# Patient Record
Sex: FEMALE | Race: WHITE | NOT HISPANIC OR LATINO | URBAN - METROPOLITAN AREA
[De-identification: names, ages, dates, MRNs, and addresses within clinical notes are randomized per-mention and may not be internally consistent; named-entity substitution may affect disease eponyms.]

---

## 2017-02-06 ENCOUNTER — INPATIENT (INPATIENT)
Facility: HOSPITAL | Age: 77
LOS: 3 days | Discharge: HOME | End: 2017-02-10
Attending: HOSPITALIST | Admitting: HOSPITALIST
Payer: COMMERCIAL

## 2017-02-06 VITALS
TEMPERATURE: 98 F | OXYGEN SATURATION: 91 % | SYSTOLIC BLOOD PRESSURE: 151 MMHG | HEIGHT: 65 IN | HEART RATE: 101 BPM | WEIGHT: 139.99 LBS | DIASTOLIC BLOOD PRESSURE: 66 MMHG | RESPIRATION RATE: 22 BRPM

## 2017-02-06 DIAGNOSIS — J18.1 LOBAR PNEUMONIA, UNSPECIFIED ORGANISM: ICD-10-CM

## 2017-02-06 DIAGNOSIS — E78.00 PURE HYPERCHOLESTEROLEMIA, UNSPECIFIED: ICD-10-CM

## 2017-02-06 DIAGNOSIS — J44.1 CHRONIC OBSTRUCTIVE PULMONARY DISEASE WITH (ACUTE) EXACERBATION: ICD-10-CM

## 2017-02-06 DIAGNOSIS — I10 ESSENTIAL (PRIMARY) HYPERTENSION: ICD-10-CM

## 2017-02-06 DIAGNOSIS — J43.9 EMPHYSEMA, UNSPECIFIED: ICD-10-CM

## 2017-02-06 LAB
ALBUMIN SERPL ELPH-MCNC: 3.4 G/DL — SIGNIFICANT CHANGE UP (ref 3.3–5)
ALP SERPL-CCNC: 67 U/L — SIGNIFICANT CHANGE UP (ref 40–120)
ALT FLD-CCNC: 27 U/L — SIGNIFICANT CHANGE UP (ref 12–78)
ANION GAP SERPL CALC-SCNC: 7 MMOL/L — SIGNIFICANT CHANGE UP (ref 5–17)
APPEARANCE UR: CLEAR — SIGNIFICANT CHANGE UP
APTT BLD: 29.5 SEC — SIGNIFICANT CHANGE UP (ref 27.5–37.4)
AST SERPL-CCNC: 20 U/L — SIGNIFICANT CHANGE UP (ref 15–37)
BACTERIA # UR AUTO: ABNORMAL
BASE EXCESS BLDA CALC-SCNC: 3.3 MMOL/L — HIGH (ref -2–2)
BILIRUB SERPL-MCNC: 0.5 MG/DL — SIGNIFICANT CHANGE UP (ref 0.2–1.2)
BILIRUB UR-MCNC: NEGATIVE — SIGNIFICANT CHANGE UP
BLOOD GAS COMMENTS: SIGNIFICANT CHANGE UP
BLOOD GAS SOURCE: SIGNIFICANT CHANGE UP
BUN SERPL-MCNC: 15 MG/DL — SIGNIFICANT CHANGE UP (ref 7–23)
CALCIUM SERPL-MCNC: 8.9 MG/DL — SIGNIFICANT CHANGE UP (ref 8.5–10.1)
CHLORIDE SERPL-SCNC: 102 MMOL/L — SIGNIFICANT CHANGE UP (ref 96–108)
CK MB BLD-MCNC: 1.1 % — SIGNIFICANT CHANGE UP (ref 0–3.5)
CK MB CFR SERPL CALC: 1.9 NG/ML — SIGNIFICANT CHANGE UP (ref 0.5–3.6)
CK SERPL-CCNC: 173 U/L — SIGNIFICANT CHANGE UP (ref 26–192)
CO2 SERPL-SCNC: 30 MMOL/L — SIGNIFICANT CHANGE UP (ref 22–31)
COLOR SPEC: YELLOW — SIGNIFICANT CHANGE UP
COMMENT - URINE: SIGNIFICANT CHANGE UP
CREAT SERPL-MCNC: 0.89 MG/DL — SIGNIFICANT CHANGE UP (ref 0.5–1.3)
DIFF PNL FLD: ABNORMAL
EPI CELLS # UR: ABNORMAL
FLUAV SPEC QL CULT: NEGATIVE — SIGNIFICANT CHANGE UP
FLUBV AG SPEC QL IA: NEGATIVE — SIGNIFICANT CHANGE UP
GLUCOSE SERPL-MCNC: 151 MG/DL — HIGH (ref 70–99)
GLUCOSE UR QL: NEGATIVE MG/DL — SIGNIFICANT CHANGE UP
HCO3 BLDA-SCNC: 26 MMOL/L — SIGNIFICANT CHANGE UP (ref 21–29)
HCT VFR BLD CALC: 38.1 % — SIGNIFICANT CHANGE UP (ref 34.5–45)
HGB BLD-MCNC: 13.2 G/DL — SIGNIFICANT CHANGE UP (ref 11.5–15.5)
HOROWITZ INDEX BLDA+IHG-RTO: 21 — SIGNIFICANT CHANGE UP
INR BLD: 1.08 RATIO — SIGNIFICANT CHANGE UP (ref 0.88–1.16)
KETONES UR-MCNC: NEGATIVE — SIGNIFICANT CHANGE UP
LACTATE SERPL-SCNC: 1.2 MMOL/L — SIGNIFICANT CHANGE UP (ref 0.7–2)
LEUKOCYTE ESTERASE UR-ACNC: ABNORMAL
LYMPHOCYTES # BLD AUTO: 9 % — LOW (ref 13–44)
MCHC RBC-ENTMCNC: 30.7 PG — SIGNIFICANT CHANGE UP (ref 27–34)
MCHC RBC-ENTMCNC: 34.8 GM/DL — SIGNIFICANT CHANGE UP (ref 32–36)
MCV RBC AUTO: 88.3 FL — SIGNIFICANT CHANGE UP (ref 80–100)
MONOCYTES NFR BLD AUTO: 2 % — SIGNIFICANT CHANGE UP (ref 2–14)
NEUTROPHILS NFR BLD AUTO: 89 % — HIGH (ref 43–77)
NITRITE UR-MCNC: NEGATIVE — SIGNIFICANT CHANGE UP
PCO2 BLDA: 37 MMHG — SIGNIFICANT CHANGE UP (ref 32–46)
PH BLD: 7.47 — HIGH (ref 7.35–7.45)
PH UR: 6 — SIGNIFICANT CHANGE UP (ref 4.8–8)
PLAT MORPH BLD: NORMAL — SIGNIFICANT CHANGE UP
PLATELET # BLD AUTO: 281 K/UL — SIGNIFICANT CHANGE UP (ref 150–400)
PO2 BLDA: 64 MMHG — LOW (ref 74–108)
POTASSIUM SERPL-MCNC: 4 MMOL/L — SIGNIFICANT CHANGE UP (ref 3.5–5.3)
POTASSIUM SERPL-SCNC: 4 MMOL/L — SIGNIFICANT CHANGE UP (ref 3.5–5.3)
PROT SERPL-MCNC: 7.5 GM/DL — SIGNIFICANT CHANGE UP (ref 6–8.3)
PROT UR-MCNC: NEGATIVE MG/DL — SIGNIFICANT CHANGE UP
PROTHROM AB SERPL-ACNC: 12.1 SEC — SIGNIFICANT CHANGE UP (ref 10–13.1)
RBC # BLD: 4.31 M/UL — SIGNIFICANT CHANGE UP (ref 3.8–5.2)
RBC # FLD: 12.1 % — SIGNIFICANT CHANGE UP (ref 11–15)
RBC BLD AUTO: NORMAL — SIGNIFICANT CHANGE UP
RBC CASTS # UR COMP ASSIST: SIGNIFICANT CHANGE UP /HPF (ref 0–4)
SAO2 % BLDA: 92 % — SIGNIFICANT CHANGE UP (ref 92–96)
SODIUM SERPL-SCNC: 139 MMOL/L — SIGNIFICANT CHANGE UP (ref 135–145)
SP GR SPEC: 1.01 — SIGNIFICANT CHANGE UP (ref 1.01–1.02)
TROPONIN I SERPL-MCNC: <.015 NG/ML — SIGNIFICANT CHANGE UP (ref 0.01–0.04)
UROBILINOGEN FLD QL: NEGATIVE MG/DL — SIGNIFICANT CHANGE UP
WBC # BLD: 20.5 K/UL — HIGH (ref 3.8–10.5)
WBC # FLD AUTO: 20.5 K/UL — HIGH (ref 3.8–10.5)
WBC UR QL: ABNORMAL

## 2017-02-06 PROCEDURE — 71250 CT THORAX DX C-: CPT | Mod: 26

## 2017-02-06 PROCEDURE — 71010: CPT | Mod: 26

## 2017-02-06 PROCEDURE — 99285 EMERGENCY DEPT VISIT HI MDM: CPT

## 2017-02-06 RX ORDER — FLUTICASONE PROPIONATE AND SALMETEROL 50; 250 UG/1; UG/1
1 POWDER ORAL; RESPIRATORY (INHALATION)
Qty: 0 | Refills: 0 | Status: DISCONTINUED | OUTPATIENT
Start: 2017-02-06 | End: 2017-02-10

## 2017-02-06 RX ORDER — PANTOPRAZOLE SODIUM 20 MG/1
40 TABLET, DELAYED RELEASE ORAL ONCE
Qty: 0 | Refills: 0 | Status: COMPLETED | OUTPATIENT
Start: 2017-02-06 | End: 2017-02-06

## 2017-02-06 RX ORDER — ALBUTEROL 90 UG/1
2.5 AEROSOL, METERED ORAL
Qty: 0 | Refills: 0 | Status: DISCONTINUED | OUTPATIENT
Start: 2017-02-06 | End: 2017-02-06

## 2017-02-06 RX ORDER — ACETYLCYSTEINE 200 MG/ML
2 VIAL (ML) MISCELLANEOUS EVERY 6 HOURS
Qty: 0 | Refills: 0 | Status: DISCONTINUED | OUTPATIENT
Start: 2017-02-06 | End: 2017-02-08

## 2017-02-06 RX ORDER — IPRATROPIUM/ALBUTEROL SULFATE 18-103MCG
3 AEROSOL WITH ADAPTER (GRAM) INHALATION EVERY 6 HOURS
Qty: 0 | Refills: 0 | Status: DISCONTINUED | OUTPATIENT
Start: 2017-02-06 | End: 2017-02-10

## 2017-02-06 RX ORDER — ENOXAPARIN SODIUM 100 MG/ML
40 INJECTION SUBCUTANEOUS EVERY 24 HOURS
Qty: 0 | Refills: 0 | Status: DISCONTINUED | OUTPATIENT
Start: 2017-02-06 | End: 2017-02-10

## 2017-02-06 RX ORDER — ATORVASTATIN CALCIUM 80 MG/1
10 TABLET, FILM COATED ORAL AT BEDTIME
Qty: 0 | Refills: 0 | Status: DISCONTINUED | OUTPATIENT
Start: 2017-02-06 | End: 2017-02-10

## 2017-02-06 RX ORDER — ASPIRIN/CALCIUM CARB/MAGNESIUM 324 MG
81 TABLET ORAL DAILY
Qty: 0 | Refills: 0 | Status: DISCONTINUED | OUTPATIENT
Start: 2017-02-06 | End: 2017-02-06

## 2017-02-06 RX ORDER — ASPIRIN/CALCIUM CARB/MAGNESIUM 324 MG
81 TABLET ORAL DAILY
Qty: 0 | Refills: 0 | Status: DISCONTINUED | OUTPATIENT
Start: 2017-02-06 | End: 2017-02-10

## 2017-02-06 RX ORDER — IPRATROPIUM/ALBUTEROL SULFATE 18-103MCG
3 AEROSOL WITH ADAPTER (GRAM) INHALATION ONCE
Qty: 0 | Refills: 0 | Status: COMPLETED | OUTPATIENT
Start: 2017-02-06 | End: 2017-02-06

## 2017-02-06 RX ORDER — ALBUTEROL 90 UG/1
2 AEROSOL, METERED ORAL
Qty: 0 | Refills: 0 | COMMUNITY

## 2017-02-06 RX ORDER — ALBUTEROL 90 UG/1
2 AEROSOL, METERED ORAL EVERY 6 HOURS
Qty: 0 | Refills: 0 | Status: DISCONTINUED | OUTPATIENT
Start: 2017-02-06 | End: 2017-02-06

## 2017-02-06 RX ORDER — MAGNESIUM SULFATE 500 MG/ML
2 VIAL (ML) INJECTION ONCE
Qty: 0 | Refills: 0 | Status: COMPLETED | OUTPATIENT
Start: 2017-02-06 | End: 2017-02-06

## 2017-02-06 RX ADMIN — Medication 3 MILLILITER(S): at 12:00

## 2017-02-06 RX ADMIN — Medication 81 MILLIGRAM(S): at 15:54

## 2017-02-06 RX ADMIN — PANTOPRAZOLE SODIUM 40 MILLIGRAM(S): 20 TABLET, DELAYED RELEASE ORAL at 12:54

## 2017-02-06 RX ADMIN — Medication 50 GRAM(S): at 13:01

## 2017-02-06 RX ADMIN — Medication 125 MILLIGRAM(S): at 12:57

## 2017-02-06 RX ADMIN — FLUTICASONE PROPIONATE AND SALMETEROL 1 DOSE(S): 50; 250 POWDER ORAL; RESPIRATORY (INHALATION) at 21:32

## 2017-02-06 RX ADMIN — ATORVASTATIN CALCIUM 10 MILLIGRAM(S): 80 TABLET, FILM COATED ORAL at 22:54

## 2017-02-06 RX ADMIN — ENOXAPARIN SODIUM 40 MILLIGRAM(S): 100 INJECTION SUBCUTANEOUS at 15:55

## 2017-02-06 RX ADMIN — Medication 3 MILLILITER(S): at 12:30

## 2017-02-06 RX ADMIN — Medication 3 MILLILITER(S): at 11:40

## 2017-02-06 NOTE — H&P ADULT. - RS GEN PE MLT RESP DETAILS PC
diminished breath sounds, R/wheezes/respirations labored/rhonchi/diminished breath sounds, L/airway patent

## 2017-02-06 NOTE — H&P ADULT. - HISTORY OF PRESENT ILLNESS
77 y/o female PMH significant for COPD/Emphysema ex tobacco use presents with a three day history of increased shortness of breath and dyspnea on exertion different from her baseline. She had  taken 10 mg of prednisone left over from a previous COPD exacerbation and reports that her albuterol inhaler has not been as effective. Patient denies sick contacts or recent travel has two pets at home (dog and bird). Patient denies fever but has increased cough productive of green sputum   Patient denies new chest pain

## 2017-02-06 NOTE — ED ADULT NURSE NOTE - OBJECTIVE STATEMENT
Pt with cough for 1 week she has a history of COPD and the cough is productive and frequent. Pt with cough for 1 week she has a history of COPD and the cough is productive and frequent. She has a history of smoking and stopped 15 to 16 years

## 2017-02-06 NOTE — H&P ADULT. - ASSESSMENT
7/7 y/[age] female with COPD presents with community aquired right upperlobe pneumonia inpatient admission required, Will need DVT prophalaxis

## 2017-02-06 NOTE — ED PROVIDER NOTE - OBJECTIVE STATEMENT
76 years old female walked in c/o productive green sputum coughs sob wheezing for 5 days and pt has been using neub every 6 hours last was this morning but worsening in the past two days. Pt sts she has a hx of copd never being et intubated and no on home O2 oxygen, last admission to hospital for copd was many years ago. Pt denies headache, dizziness, blurred visions, chest pain, nausea, vomiting, fever, chills, abd pain, dysuria or irregular bowel movements.

## 2017-02-06 NOTE — CONSULT NOTE ADULT - SUBJECTIVE AND OBJECTIVE BOX
Patient is a 76y old  Female who presents with a chief complaint of Increasing shortnesss of breath (2017 14:55)      HPI:  75 y/o female PMH significant for HTN, HLD, COPD/Emphysema(not on home O2), ex tobacco use presents with a three day history of increased shortness of breath and dyspnea on exertion different from her baseline. Reports it started after a cold like illness and since then has gotten worst. She had  taken 10 mg of prednisone left over from a previous COPD exacerbation and reports that her albuterol inhaler has not been as effective. Patient denies sick contacts or recent travel. Denies fever but has increased cough productive of green sputum which some times is difficult to cough up. Up to date with pneumo vaccine but did not get Flu vaccine.  Admitted with RUL pneumonia and COPD exacerbation. Smoked close to 40 years, quit 15 years ago.    PAST MEDICAL & SURGICAL HISTORY:  COPD (chronic obstructive pulmonary disease)  Hypercholesteremia  Hypertension  No significant past surgical history      FAMILY HISTORY:  No pertinent family history in first degree relatives    SOCIAL HISTORY: BMI (kg/m2): 23.3 . 40 year smoking. quit 15 years ago.    Allergies  penicillin (Hives; Rash)    MEDICATIONS  (STANDING):  aspirin  chewable 81milliGRAM(s) Oral daily  atorvastatin 10milliGRAM(s) Oral at bedtime  ALBUTerol    90 MICROgram(s) HFA Inhaler 2Puff(s) Inhalation every 6 hours  enoxaparin Injectable 40milliGRAM(s) SubCutaneous every 24 hours  predniSONE   Tablet 50milliGRAM(s) Oral daily  fluticasone / salmeterol 250-50 MICROgram(s) Diskus 1Dose(s) Inhalation two times a day    MEDICATIONS  (PRN):  ALBUTerol    0.083% 2.5milliGRAM(s) Nebulizer four times a day PRN Bronchospasm    REVIEW OF SYSTEMS:    Constitutional:            No fever, weight loss or fatigue  HEENT:                         No difficulty hearing, tinnitus, vertigo; No sinus or throat pain  Respiratory:              sob and cough  Cardiovascular:           No chest pain, palpitations  Gastrointestinal:        No abdominal or epigastric pain. No N/V/diarrhea or hematemesis  Genitourinary:            No dysuria, frequency, hematuria or incontinence  SKIN:                             no rash  Musculoskeletal:        No joint pain or swelling  Extremities:                No swelling  Neurological:              No headaches  Psychiatric:                 No depression, anxiety    Vital Signs Last 24 Hrs  T(C): 36.7, Max: 36.9 ( @ 10:32)  T(F): 98, Max: 98.4 ( @ 10:32)  HR: 85 (85 - 101)  BP: 100/56 (100/56 - 151/66)  BP(mean): --  RR: 19 (19 - 22)  SpO2: 97% (91% - 97%)    PHYSICAL EXAM:  GEN:         Awake, responsive and comfortable.  HEENT:    Normal.    RESP:       wheezing.  CVS:           Regular rate and rhythm.   ABD:         Soft, non-tender, non-distended;   :             No costovertebral angle tenderness  SKIN:           Warm and dry.  EXTR:            No clubbing, cyanosis or edema  CNS:              Intact sensory and motor function.  PSYCH:        cooperative, no anxiety or depression    LABS:                        13.2   20.5  )-----------( 281      ( 2017 12:49 )             38.1     2017 12:49    139    |  102    |  15     ----------------------------<  151    4.0     |  30     |  0.89     Ca    8.9        2017 12:49    TPro  7.5    /  Alb  3.4    /  TBili  0.5    /  DBili  x      /  AST  20     /  ALT  27     /  AlkPhos  67     2017 12:49    PT/INR - ( 2017 12:49 )   PT: 12.1 sec;   INR: 1.08 ratio         PTT - ( 2017 12:49 )  PTT:29.5 sec  ABG -  @ 12:07  pH: 7.47 / pO2: 37 / HCO3: 26 / Base Excess: 3.3 / SaO2: 92    Urinalysis Basic - ( 2017 19:02 )    Color: Yellow / Appearance: Clear / S.010 / pH: x  Gluc: x / Ketone: Negative  / Bili: Negative / Urobili: Negative mg/dL   Blood: x / Protein: Negative mg/dL / Nitrite: Negative   Leuk Esterase: Moderate / RBC: 0-2 /HPF / WBC 11-25   Sq Epi: x / Non Sq Epi: Moderate / Bacteria: Many    EKG: sinus rhythm.    RADIOLOGY & ADDITIONAL STUDIES:    EXAM:  CT CHEST                            PROCEDURE DATE:  2017        INTERPRETATION:  Clinical information: Right upper lobe infiltrate,   question cavitation    Comparison: None    PROCEDURE:   CT of the Chest was performed without intravenous contrast.    FINDINGS:    CHEST:    CHEST WALL AND LOWER NECK: Within normal limits  MEDIASTINUM AND NIYAH: 1 cm mediastinal lymph nodes, nonspecific and may   be reactive.  HEART: Extensive coronary calcifications.  VESSELS: Unopacified great vessels are unremarkable.  LUNG AND LARGE AIRWAYS: Extensive emphysema. Patchy airspace opacities in   the right upper lobe. 1 cm nodule anterior segment right upper lobe image   49, probably infectious but should be followed. Scattered tree-in-bud   opacities in the right lower lobe. No pleural effusion. 7 mm nodule left   lower lobe with probable fat, possibly hamartoma but should be followed.   4 mm nodule left upper lobe, indeterminate.    UPPER ABDOMEN: Within normal limits    MUSCULOSKELETAL: Within normal limits    IMPRESSION:     Right upper lobe pneumonia. Extensive emphysema. Incidental subcentimeter   lung nodules, as described, 3-6 months follow-up with a low dose CT is   recommended.    KASIA SEYMOUR M.D., ATTENDING RADIOLOGIST  This document has been electronically signed. 2017  1:52PM      ASSESSMENT AND PLAN:  ·	RUL pneumonia  (CAP).  ·	Acute COPD exacerbation.  ·	SOB.  ·	Cough.  ·	Leukocytosis.  ·	UTI.  ·	HTN.  ·	HLD    Supplemental O2.  Nebulizer with Mucomyst.  Antibiotics.  Steroids.  DVT prophylaxis.

## 2017-02-06 NOTE — ED PROVIDER NOTE - CONSTITUTIONAL, MLM
normal... Well appearing, well nourished, awake, alert, oriented to person, place, time/situation and in no apparent distress. Speaking in clear full sentences active coughing, + nasal flaring no shoulders retractions no diaphoresis

## 2017-02-07 DIAGNOSIS — D72.829 ELEVATED WHITE BLOOD CELL COUNT, UNSPECIFIED: ICD-10-CM

## 2017-02-07 LAB
ALBUMIN SERPL ELPH-MCNC: 3.2 G/DL — LOW (ref 3.3–5)
ALP SERPL-CCNC: 70 U/L — SIGNIFICANT CHANGE UP (ref 40–120)
ALT FLD-CCNC: 23 U/L — SIGNIFICANT CHANGE UP (ref 12–78)
ANION GAP SERPL CALC-SCNC: 8 MMOL/L — SIGNIFICANT CHANGE UP (ref 5–17)
AST SERPL-CCNC: 20 U/L — SIGNIFICANT CHANGE UP (ref 15–37)
BILIRUB SERPL-MCNC: 0.3 MG/DL — SIGNIFICANT CHANGE UP (ref 0.2–1.2)
BUN SERPL-MCNC: 19 MG/DL — SIGNIFICANT CHANGE UP (ref 7–23)
CALCIUM SERPL-MCNC: 9.1 MG/DL — SIGNIFICANT CHANGE UP (ref 8.5–10.1)
CHLORIDE SERPL-SCNC: 103 MMOL/L — SIGNIFICANT CHANGE UP (ref 96–108)
CO2 SERPL-SCNC: 29 MMOL/L — SIGNIFICANT CHANGE UP (ref 22–31)
CREAT SERPL-MCNC: 0.98 MG/DL — SIGNIFICANT CHANGE UP (ref 0.5–1.3)
CRP SERPL-MCNC: 16 MG/DL — HIGH (ref 0–0.4)
GLUCOSE SERPL-MCNC: 150 MG/DL — HIGH (ref 70–99)
GRAM STN FLD: SIGNIFICANT CHANGE UP
HCT VFR BLD CALC: 37.5 % — SIGNIFICANT CHANGE UP (ref 34.5–45)
HGB BLD-MCNC: 13.2 G/DL — SIGNIFICANT CHANGE UP (ref 11.5–15.5)
LACTATE SERPL-SCNC: 2.3 MMOL/L — HIGH (ref 0.7–2)
LEGIONELLA AG UR QL: NEGATIVE — SIGNIFICANT CHANGE UP
MCHC RBC-ENTMCNC: 31.7 PG — SIGNIFICANT CHANGE UP (ref 27–34)
MCHC RBC-ENTMCNC: 35.3 GM/DL — SIGNIFICANT CHANGE UP (ref 32–36)
MCV RBC AUTO: 89.7 FL — SIGNIFICANT CHANGE UP (ref 80–100)
PLATELET # BLD AUTO: 338 K/UL — SIGNIFICANT CHANGE UP (ref 150–400)
POTASSIUM SERPL-MCNC: 4.8 MMOL/L — SIGNIFICANT CHANGE UP (ref 3.5–5.3)
POTASSIUM SERPL-SCNC: 4.8 MMOL/L — SIGNIFICANT CHANGE UP (ref 3.5–5.3)
PROCALCITONIN SERPL-MCNC: 0.12 NG/ML — HIGH (ref 0–0.05)
PROT SERPL-MCNC: 7.5 GM/DL — SIGNIFICANT CHANGE UP (ref 6–8.3)
RBC # BLD: 4.18 M/UL — SIGNIFICANT CHANGE UP (ref 3.8–5.2)
RBC # FLD: 12.3 % — SIGNIFICANT CHANGE UP (ref 11–15)
SODIUM SERPL-SCNC: 140 MMOL/L — SIGNIFICANT CHANGE UP (ref 135–145)
SPECIMEN SOURCE: SIGNIFICANT CHANGE UP
WBC # BLD: 22.7 K/UL — HIGH (ref 3.8–10.5)
WBC # FLD AUTO: 22.7 K/UL — HIGH (ref 3.8–10.5)

## 2017-02-07 PROCEDURE — 99223 1ST HOSP IP/OBS HIGH 75: CPT

## 2017-02-07 RX ORDER — SODIUM CHLORIDE 9 MG/ML
1000 INJECTION INTRAMUSCULAR; INTRAVENOUS; SUBCUTANEOUS ONCE
Qty: 0 | Refills: 0 | Status: COMPLETED | OUTPATIENT
Start: 2017-02-07 | End: 2017-02-07

## 2017-02-07 RX ORDER — SODIUM CHLORIDE 9 MG/ML
1000 INJECTION INTRAMUSCULAR; INTRAVENOUS; SUBCUTANEOUS
Qty: 0 | Refills: 0 | Status: DISCONTINUED | OUTPATIENT
Start: 2017-02-07 | End: 2017-02-10

## 2017-02-07 RX ADMIN — ATORVASTATIN CALCIUM 10 MILLIGRAM(S): 80 TABLET, FILM COATED ORAL at 21:10

## 2017-02-07 RX ADMIN — Medication 2 MILLILITER(S): at 06:09

## 2017-02-07 RX ADMIN — FLUTICASONE PROPIONATE AND SALMETEROL 1 DOSE(S): 50; 250 POWDER ORAL; RESPIRATORY (INHALATION) at 06:09

## 2017-02-07 RX ADMIN — Medication 40 MILLIGRAM(S): at 21:10

## 2017-02-07 RX ADMIN — Medication 3 MILLILITER(S): at 00:27

## 2017-02-07 RX ADMIN — Medication 2 MILLILITER(S): at 00:27

## 2017-02-07 RX ADMIN — SODIUM CHLORIDE 100 MILLILITER(S): 9 INJECTION INTRAMUSCULAR; INTRAVENOUS; SUBCUTANEOUS at 12:41

## 2017-02-07 RX ADMIN — SODIUM CHLORIDE 100 MILLILITER(S): 9 INJECTION INTRAMUSCULAR; INTRAVENOUS; SUBCUTANEOUS at 16:47

## 2017-02-07 RX ADMIN — Medication 81 MILLIGRAM(S): at 12:41

## 2017-02-07 RX ADMIN — Medication 50 MILLIGRAM(S): at 06:09

## 2017-02-07 RX ADMIN — Medication 3 MILLILITER(S): at 06:09

## 2017-02-07 RX ADMIN — Medication 2 MILLILITER(S): at 18:10

## 2017-02-07 RX ADMIN — Medication 3 MILLILITER(S): at 18:09

## 2017-02-07 RX ADMIN — Medication 3 MILLILITER(S): at 12:10

## 2017-02-07 RX ADMIN — ENOXAPARIN SODIUM 40 MILLIGRAM(S): 100 INJECTION SUBCUTANEOUS at 16:47

## 2017-02-07 RX ADMIN — SODIUM CHLORIDE 1000 MILLILITER(S): 9 INJECTION INTRAMUSCULAR; INTRAVENOUS; SUBCUTANEOUS at 10:46

## 2017-02-07 RX ADMIN — Medication 2 MILLILITER(S): at 12:10

## 2017-02-07 NOTE — PROGRESS NOTE ADULT - SUBJECTIVE AND OBJECTIVE BOX
INTERVAL HPI/OVERNIGHT EVENTS:  77 y/o female PMH significant for HTN, HLD, COPD/Emphysema(not on home O2), ex tobacco use presents with a three day history of increased shortness of breath and dyspnea on exertion different from her baseline. Reports it started after a cold like illness and since then has gotten worst. She had  taken 10 mg of prednisone left over from a previous COPD exacerbation and reports that her albuterol inhaler has not been as effective. Patient denies sick contacts or recent travel. Denies fever but has increased cough productive of green sputum which some times is difficult to cough up. Up to date with pneumo vaccine but did not get Flu vaccine.  Admitted with RUL pneumonia and COPD exacerbation. Smoked close to 40 years, quit 15 years ago.  Still with  episodes of cough.    Vital Signs Last 24 Hrs  T(C): 36.7, Max: 36.8 (02-07 @ 03:46)  T(F): 98, Max: 98.3 (02-07 @ 12:20)  HR: 99 (78 - 100)  BP: 134/70 (105/57 - 150/66)  BP(mean): --  RR: 18 (14 - 18)  SpO2: 95% (93% - 95%)    PHYSICAL EXAM:  GEN:        Awake, responsive and comfortable.  HEENT:    Normal.    RESP:      decreased air entry.  CVS:          Regular rate and rhythm.   ABD:         Soft, non-tender, non-distended;   :             No costovertebral angle tenderness  EXTR:            No clubbing, cyanosis or edema  CNS:              Intact sensory and motor function.    MEDICATIONS  (STANDING):  aspirin  chewable 81milliGRAM(s) Oral daily  atorvastatin 10milliGRAM(s) Oral at bedtime  enoxaparin Injectable 40milliGRAM(s) SubCutaneous every 24 hours  predniSONE   Tablet 50milliGRAM(s) Oral daily  fluticasone / salmeterol 250-50 MICROgram(s) Diskus 1Dose(s) Inhalation two times a day  ALBUTerol/ipratropium for Nebulization 3milliLiter(s) Nebulizer every 6 hours  acetylcysteine 10% Inhalation 2milliLiter(s) Inhalation every 6 hours  levoFLOXacin IVPB 750milliGRAM(s) IV Intermittent every 24 hours  sodium chloride 0.9%. 1000milliLiter(s) IV Continuous <Continuous>    LABS:                        13.2   22.7  )-----------( 338      ( 2017 06:02 )             37.5     2017 06:02    140    |  103    |  19     ----------------------------<  150    4.8     |  29     |  0.98     Ca    9.1        2017 06:02    TPro  7.5    /  Alb  3.2    /  TBili  0.3    /  DBili  x      /  AST  20     /  ALT  23     /  AlkPhos  70     2017 06:02    PT/INR - ( 2017 12:49 )   PT: 12.1 sec;   INR: 1.08 ratio         PTT - ( 2017 12:49 )  PTT:29.5 sec    Urinalysis Basic - ( 2017 19:02 )    Color: Yellow / Appearance: Clear / S.010 / pH: x  Gluc: x / Ketone: Negative  / Bili: Negative / Urobili: Negative mg/dL   Blood: x / Protein: Negative mg/dL / Nitrite: Negative   Leuk Esterase: Moderate / RBC: 0-2 /HPF / WBC 11-25   Sq Epi: x / Non Sq Epi: Moderate / Bacteria: Many    ASSESSMENT AND PLAN:  ·	RUL pneumonia  (CAP).  ·	Acute COPD exacerbation.  ·	SOB.  ·	Cough.  ·	Leukocytosis.  ·	UTI.  ·	HTN.  ·	HLD    Continue nebulizer and Mucomyst.  Continue antibiotics.  On steroids, will change to IV.  Follow cultures.

## 2017-02-07 NOTE — PROGRESS NOTE ADULT - SUBJECTIVE AND OBJECTIVE BOX
Patient is a 76y old  Female who presents with a chief complaint of Increasing shortnesss of breath (2017 14:55)      INTERVAL HPI/OVERNIGHT EVENTS: NO acute events overrnight continues to be short of breath but is feeling ok today     MEDICATIONS  (STANDING):  aspirin  chewable 81milliGRAM(s) Oral daily  atorvastatin 10milliGRAM(s) Oral at bedtime  enoxaparin Injectable 40milliGRAM(s) SubCutaneous every 24 hours  predniSONE   Tablet 50milliGRAM(s) Oral daily  fluticasone / salmeterol 250-50 MICROgram(s) Diskus 1Dose(s) Inhalation two times a day  ALBUTerol/ipratropium for Nebulization 3milliLiter(s) Nebulizer every 6 hours  acetylcysteine 10% Inhalation 2milliLiter(s) Inhalation every 6 hours  levoFLOXacin IVPB 750milliGRAM(s) IV Intermittent every 24 hours  sodium chloride 0.9%. 1000milliLiter(s) IV Continuous <Continuous>    MEDICATIONS  (PRN):      Allergies    penicillin (Hives; Rash)    Intolerances        REVIEW OF SYSTEMS:  CONSTITUTIONAL: No fever, weight loss, or fatigue  EYES: No eye pain, visual disturbances, or discharge  ENMT:  No difficulty hearing, tinnitus, vertigo; No sinus or throat pain  NECK: No pain or stiffness  BREASTS: No pain, masses, or nipple discharge  RESPIRATORY: Positive cough and wheeze with shortness of dbreadth   CARDIOVASCULAR: No chest pain, palpitations, dizziness, or leg swelling  GASTROINTESTINAL: No abdominal or epigastric pain. No nausea, vomiting, or hematemesis; No diarrhea or constipation. No melena or hematochezia.  GENITOURINARY: No dysuria, frequency, hematuria, or incontinence  NEUROLOGICAL: No headaches, memory loss, loss of strength, numbness, or tremors  SKIN: No itching, burning, rashes, or lesions   LYMPH NODES: No enlarged glands  ENDOCRINE: No heat or cold intolerance; No hair loss  MUSCULOSKELETAL: No joint pain or swelling; No muscle, back, or extremity pain  PSYCHIATRIC: No depression, anxiety, mood swings, or difficulty sleeping  HEME/LYMPH: No easy bruising, or bleeding gums  ALLERGY AND IMMUNOLOGIC: No hives or eczema    Vital Signs Last 24 Hrs  T(C): 36.8, Max: 36.8 ( @ 03:46)  T(F): 98.2, Max: 98.2 (- @ 03:46)  HR: 87 (78 - 87)  BP: 121/71 (100/56 - 131/67)  BP(mean): --  RR: 15 (15 - 19)  SpO2: 95% (93% - 97%)    PHYSICAL EXAM:  GENERAL: NAD, well-groomed, well-developed  HEAD:  Atraumatic, Normocephalic  EYES: EOMI, PERRLA, conjunctiva and sclera clear  ENMT: No tonsillar erythema, exudates, or enlargement; Moist mucous membranes, Good dentition, No lesions  NECK: Supple, No JVD, Normal thyroid  NERVOUS SYSTEM:  Alert & Oriented X3, Good concentration; Motor Strength 5/5 B/L upper and lower extremities; DTRs 2+ intact and symmetric  CHEST/LUNG:decreased breadth sounds right lung   HEART: Regular rate and rhythm; No murmurs, rubs, or gallops  ABDOMEN: Soft, Nontender, Nondistended; Bowel sounds present  EXTREMITIES:  2+ Peripheral Pulses, No clubbing, cyanosis, or edema  LYMPH: No lymphadenopathy noted  SKIN: No rashes or lesions    LABS:                        13.2   22.7  )-----------( 338      ( 2017 06:02 )             37.5     2017 06:02    140    |  103    |  19     ----------------------------<  150    4.8     |  29     |  0.98     Ca    9.1        2017 06:02    TPro  7.5    /  Alb  3.2    /  TBili  0.3    /  DBili  x      /  AST  20     /  ALT  23     /  AlkPhos  70     2017 06:02    PT/INR - ( 2017 12:49 )   PT: 12.1 sec;   INR: 1.08 ratio         PTT - ( 2017 12:49 )  PTT:29.5 sec  Urinalysis Basic - ( 2017 19:02 )    Color: Yellow / Appearance: Clear / S.010 / pH: x  Gluc: x / Ketone: Negative  / Bili: Negative / Urobili: Negative mg/dL   Blood: x / Protein: Negative mg/dL / Nitrite: Negative   Leuk Esterase: Moderate / RBC: 0-2 /HPF / WBC 11-25   Sq Epi: x / Non Sq Epi: Moderate / Bacteria: Many      CAPILLARY BLOOD GLUCOSE      RADIOLOGY & ADDITIONAL TESTS:    Imaging Personally Reviewed:  [X ] YES  [ ] NO    Consultant(s) Notes Reviewed:  XX ] YES  [ ] NO    Care Discussed with Consultants/Other Providers [X ] YES  [ ] NO

## 2017-02-07 NOTE — CONSULT NOTE ADULT - SUBJECTIVE AND OBJECTIVE BOX
Infectious Diseases - Attending at Dr. Arias    HPI:  77 y/o female PMH significant for COPD/Emphysema ex tobacco use presents with a three day history of increased shortness of breath and dyspnea on exertion different from her baseline. She had  taken 10 mg of prednisone left over from a previous COPD exacerbation and reports that her albuterol inhaler has not been as effective. Patient denies sick contacts or recent travel has two pets at home (dog and bird). Patient denies fever but has increased cough productive of green sputum   Patient denies new chest pain (2017 14:55)  Not able to bring phlegm anymore ,started on Iv antibiotics for a possible Pneumonia.      PAST MEDICAL & SURGICAL HISTORY:  COPD (chronic obstructive pulmonary disease)  Hypercholesteremia  Hypertension  No significant past surgical history      Allergies    penicillin (Hives; Rash)    Intolerances        FAMILY HISTORY:  No pertinent family history in first degree relatives      SOCIAL HISTORY:quit smoking 15 yrs ago    REVIEW OF SYSTEMS:    Constitutional: No fever, weight loss + fatigue  Eyes: No eye pain, visual disturbances, or discharge  ENT:  No difficulty hearing, tinnitus, vertigo; No sinus or throat pain  Neck: No pain or stiffness  Breasts: No pain, masses or nipple discharge  Respiratory: + cough,+ wheezing,NO hemoptysis  Cardiovascular: No chest pain, palpitations,+ shortness of breath, dizziness or leg swelling  Gastrointestinal: No abdominal or epigastric pain. No nausea, vomiting or hematemesis; No diarrhea or constipation. No melena or hematochezia.  Genitourinary: No dysuria, frequency, hematuria or incontinence  Rectal: No pain, hemorrhoids or incontinence  Neurological: No headaches, memory loss, loss of strength, numbness or tremors  Skin: No itching, burning, rashes or lesions   Lymph Nodes: No enlarged glands  Endocrine: No heat or cold intolerance; No hair loss  Musculoskeletal: No joint pain or swelling; No muscle, back or extremity pain  Psychiatric: No depression, anxiety, mood swings or difficulty sleeping  Heme/Lymph: No easy bruising or bleeding gums  Allergy and Immunologic: No hives or eczema    MEDICATIONS  (STANDING):  aspirin  chewable 81milliGRAM(s) Oral daily  atorvastatin 10milliGRAM(s) Oral at bedtime  enoxaparin Injectable 40milliGRAM(s) SubCutaneous every 24 hours  fluticasone / salmeterol 250-50 MICROgram(s) Diskus 1Dose(s) Inhalation two times a day  ALBUTerol/ipratropium for Nebulization 3milliLiter(s) Nebulizer every 6 hours  acetylcysteine 10% Inhalation 2milliLiter(s) Inhalation every 6 hours  levoFLOXacin IVPB 750milliGRAM(s) IV Intermittent every 24 hours  sodium chloride 0.9%. 1000milliLiter(s) IV Continuous <Continuous>  methylPREDNISolone sodium succinate Injectable 40milliGRAM(s) IV Push every 8 hours    MEDICATIONS  (PRN):      Vital Signs Last 24 Hrs  T(C): 36.7, Max: 36.8 ( @ 03:46)  T(F): 98, Max: 98.3 ( @ 12:20)  HR: 79 (78 - 100)  BP: 134/70 (105/57 - 150/66)  BP(mean): --  RR: 18 (14 - 18)  SpO2: 96% (93% - 96%)    PHYSICAL EXAM:    Constitutional: NAD, well-groomed, well-developed  HEENT: PERRLA, EOMI, Normal Hearing, MMM  Neck: No LAD, No JVD  Back: Normal spine flexure, No CVA tenderness  Respiratory: CTAB/L,wheezes present   Cardiovascular: S1 and S2, RRR, no M/G/R  Gastrointestinal: BS+, soft, NT/ND  Extremities: No peripheral edema  Vascular: 2+ peripheral pulses  Neurological: A/O x 3, no focal deficits  Skin: No rashes      LABS:                        13.2   22.7  )-----------( 338      ( 2017 06:02 )             37.5     2017 06:02    140    |  103    |  19     ----------------------------<  150    4.8     |  29     |  0.98     Ca    9.1        2017 06:02    TPro  7.5    /  Alb  3.2    /  TBili  0.3    /  DBili  x      /  AST  20     /  ALT  23     /  AlkPhos  70     2017 06:02    PT/INR - ( 2017 12:49 )   PT: 12.1 sec;   INR: 1.08 ratio         PTT - ( 2017 12:49 )  PTT:29.5 sec  Urinalysis Basic - ( 2017 19:02 )    Color: Yellow / Appearance: Clear / S.010 / pH: x  Gluc: x / Ketone: Negative  / Bili: Negative / Urobili: Negative mg/dL   Blood: x / Protein: Negative mg/dL / Nitrite: Negative   Leuk Esterase: Moderate / RBC: 0-2 /HPF / WBC 11-25   Sq Epi: x / Non Sq Epi: Moderate / Bacteria: Many        MICROBIOLOGY:  RECENT CULTURES:   .Sputum Sputum conical XXXX   Few WBC's  Few squamous epithelial cells  Rare Gram Positive Cocci in Clusters  Rare Yeast like cells XXXX    - .Blood Blood-Peripheral XXXX XXXX   No growth to date.          RADIOLOGY & ADDITIONAL STUDIES:EXAM:  CHEST SINGLE VIEW                            PROCEDURE DATE:  2017        INTERPRETATION:  CLINICAL INFORMATION:copd    TECHNIQUE: AP chest film. Comparison is made to 11/10/2015    FINDINGS: There are patchy opacities in the right upper lobe.  Heart size   is within normal limits. The osseous structures are intact.    IMPRESSION: New right upper lobe patchy opacities compatible with   pneumonia.

## 2017-02-07 NOTE — ED ADULT NURSE REASSESSMENT NOTE - NS ED NURSE REASSESS COMMENT FT1
pt received alert and oriented x3, pt  denies sob, chest pain, pt afebrile, pt not compliant with o2 states it is making her nose dry, pt educated on need for oxygen due to low sat on room air ,91 .
Pt continues with cough no SOB noted. She is alert and oriented. IV access patent. She is admitted pending bed assignment

## 2017-02-08 DIAGNOSIS — R50.9 FEVER, UNSPECIFIED: ICD-10-CM

## 2017-02-08 LAB
ALBUMIN SERPL ELPH-MCNC: 2.6 G/DL — LOW (ref 3.3–5)
ALP SERPL-CCNC: 62 U/L — SIGNIFICANT CHANGE UP (ref 40–120)
ALT FLD-CCNC: 24 U/L — SIGNIFICANT CHANGE UP (ref 12–78)
ANION GAP SERPL CALC-SCNC: 6 MMOL/L — SIGNIFICANT CHANGE UP (ref 5–17)
AST SERPL-CCNC: 26 U/L — SIGNIFICANT CHANGE UP (ref 15–37)
BILIRUB SERPL-MCNC: 0.4 MG/DL — SIGNIFICANT CHANGE UP (ref 0.2–1.2)
BUN SERPL-MCNC: 22 MG/DL — SIGNIFICANT CHANGE UP (ref 7–23)
CALCIUM SERPL-MCNC: 8.5 MG/DL — SIGNIFICANT CHANGE UP (ref 8.5–10.1)
CHLORIDE SERPL-SCNC: 109 MMOL/L — HIGH (ref 96–108)
CO2 SERPL-SCNC: 27 MMOL/L — SIGNIFICANT CHANGE UP (ref 22–31)
CREAT SERPL-MCNC: 0.88 MG/DL — SIGNIFICANT CHANGE UP (ref 0.5–1.3)
CULTURE RESULTS: SIGNIFICANT CHANGE UP
GLUCOSE SERPL-MCNC: 172 MG/DL — HIGH (ref 70–99)
HCT VFR BLD CALC: 33.6 % — LOW (ref 34.5–45)
HGB BLD-MCNC: 11.9 G/DL — SIGNIFICANT CHANGE UP (ref 11.5–15.5)
M TB TUBERC IFN-G BLD QL: 0 IU/ML — SIGNIFICANT CHANGE UP
M TB TUBERC IFN-G BLD QL: 0.02 IU/ML — SIGNIFICANT CHANGE UP
M TB TUBERC IFN-G BLD QL: ABNORMAL
MCHC RBC-ENTMCNC: 32.4 PG — SIGNIFICANT CHANGE UP (ref 27–34)
MCHC RBC-ENTMCNC: 35.3 GM/DL — SIGNIFICANT CHANGE UP (ref 32–36)
MCV RBC AUTO: 91.8 FL — SIGNIFICANT CHANGE UP (ref 80–100)
MITOGEN IGNF BCKGRD COR BLD-ACNC: 0.08 IU/ML — SIGNIFICANT CHANGE UP
PLATELET # BLD AUTO: 275 K/UL — SIGNIFICANT CHANGE UP (ref 150–400)
POTASSIUM SERPL-MCNC: 4.6 MMOL/L — SIGNIFICANT CHANGE UP (ref 3.5–5.3)
POTASSIUM SERPL-SCNC: 4.6 MMOL/L — SIGNIFICANT CHANGE UP (ref 3.5–5.3)
PROCALCITONIN SERPL-MCNC: 0.2 NG/ML — HIGH (ref 0–0.05)
PROT SERPL-MCNC: 6.4 GM/DL — SIGNIFICANT CHANGE UP (ref 6–8.3)
RBC # BLD: 3.67 M/UL — LOW (ref 3.8–5.2)
RBC # FLD: 13 % — SIGNIFICANT CHANGE UP (ref 11–15)
SODIUM SERPL-SCNC: 142 MMOL/L — SIGNIFICANT CHANGE UP (ref 135–145)
SPECIMEN SOURCE: SIGNIFICANT CHANGE UP
WBC # BLD: 25.9 K/UL — HIGH (ref 3.8–10.5)
WBC # FLD AUTO: 25.9 K/UL — HIGH (ref 3.8–10.5)

## 2017-02-08 PROCEDURE — 99233 SBSQ HOSP IP/OBS HIGH 50: CPT

## 2017-02-08 RX ORDER — ACETAMINOPHEN 500 MG
650 TABLET ORAL EVERY 6 HOURS
Qty: 0 | Refills: 0 | Status: DISCONTINUED | OUTPATIENT
Start: 2017-02-08 | End: 2017-02-10

## 2017-02-08 RX ADMIN — Medication 650 MILLIGRAM(S): at 00:41

## 2017-02-08 RX ADMIN — Medication 3 MILLILITER(S): at 17:01

## 2017-02-08 RX ADMIN — Medication 3 MILLILITER(S): at 00:18

## 2017-02-08 RX ADMIN — SODIUM CHLORIDE 100 MILLILITER(S): 9 INJECTION INTRAMUSCULAR; INTRAVENOUS; SUBCUTANEOUS at 19:08

## 2017-02-08 RX ADMIN — FLUTICASONE PROPIONATE AND SALMETEROL 1 DOSE(S): 50; 250 POWDER ORAL; RESPIRATORY (INHALATION) at 07:19

## 2017-02-08 RX ADMIN — ATORVASTATIN CALCIUM 10 MILLIGRAM(S): 80 TABLET, FILM COATED ORAL at 22:03

## 2017-02-08 RX ADMIN — Medication 81 MILLIGRAM(S): at 13:40

## 2017-02-08 RX ADMIN — Medication 40 MILLIGRAM(S): at 22:03

## 2017-02-08 RX ADMIN — Medication 3 MILLILITER(S): at 11:35

## 2017-02-08 RX ADMIN — FLUTICASONE PROPIONATE AND SALMETEROL 1 DOSE(S): 50; 250 POWDER ORAL; RESPIRATORY (INHALATION) at 19:05

## 2017-02-08 RX ADMIN — Medication 200 MILLIGRAM(S): at 22:03

## 2017-02-08 RX ADMIN — Medication 2 MILLILITER(S): at 11:35

## 2017-02-08 RX ADMIN — Medication 40 MILLIGRAM(S): at 13:41

## 2017-02-08 RX ADMIN — Medication 40 MILLIGRAM(S): at 05:05

## 2017-02-08 RX ADMIN — SODIUM CHLORIDE 100 MILLILITER(S): 9 INJECTION INTRAMUSCULAR; INTRAVENOUS; SUBCUTANEOUS at 05:05

## 2017-02-08 RX ADMIN — Medication 3 MILLILITER(S): at 05:46

## 2017-02-08 RX ADMIN — ENOXAPARIN SODIUM 40 MILLIGRAM(S): 100 INJECTION SUBCUTANEOUS at 13:41

## 2017-02-08 RX ADMIN — Medication 2 MILLILITER(S): at 05:47

## 2017-02-08 RX ADMIN — Medication 2 MILLILITER(S): at 17:02

## 2017-02-08 RX ADMIN — Medication 2 MILLILITER(S): at 00:18

## 2017-02-08 NOTE — PROGRESS NOTE ADULT - SUBJECTIVE AND OBJECTIVE BOX
Patient is a 76y old  Female who presents with a chief complaint of Increasing shortnesss of breath (06 Feb 2017 14:55)      INTERVAL HPI / OVERNIGHT EVENTS:says breathing is improving,less cough.had fever last night X 1    MEDICATIONS  (STANDING):  aspirin  chewable 81milliGRAM(s) Oral daily  atorvastatin 10milliGRAM(s) Oral at bedtime  enoxaparin Injectable 40milliGRAM(s) SubCutaneous every 24 hours  fluticasone / salmeterol 250-50 MICROgram(s) Diskus 1Dose(s) Inhalation two times a day  ALBUTerol/ipratropium for Nebulization 3milliLiter(s) Nebulizer every 6 hours  acetylcysteine 10% Inhalation 2milliLiter(s) Inhalation every 6 hours  levoFLOXacin IVPB 750milliGRAM(s) IV Intermittent every 24 hours  sodium chloride 0.9%. 1000milliLiter(s) IV Continuous <Continuous>  methylPREDNISolone sodium succinate Injectable 40milliGRAM(s) IV Push every 8 hours  guaiFENesin    Syrup 200milliGRAM(s) Oral once    MEDICATIONS  (PRN):  acetaminophen   Tablet 650milliGRAM(s) Oral every 6 hours PRN For Temp greater than 38 C (100.4 F)      Vital Signs Last 24 Hrs  T(C): 37.1, Max: 38.9 (02-08 @ 00:07)  T(F): 98.8, Max: 102 (02-08 @ 00:07)  HR: 83 (80 - 110)  BP: 117/52 (109/54 - 162/65)  BP(mean): --  RR: 17 (17 - 18)  SpO2: 95% (92% - 97%)    PHYSICAL EXAM:    Constitutional: NAD, well-groomed, well-developed  Respiratory: CTAB/L  Cardiovascular: S1 and S2, RRR, no M/G/R  Gastrointestinal: BS+, soft, NT/ND  Extremities: No peripheral edema  Vascular: 2+ peripheral pulses  Skin: No rashes      LABS:                        11.9   25.9  )-----------( 275      ( 08 Feb 2017 08:25 )             33.6     08 Feb 2017 08:25    142    |  109    |  22     ----------------------------<  172    4.6     |  27     |  0.88     Ca    8.5        08 Feb 2017 08:25    TPro  6.4    /  Alb  2.6    /  TBili  0.4    /  DBili  x      /  AST  26     /  ALT  24     /  AlkPhos  62     08 Feb 2017 08:25            MICROBIOLOGY:  RECENT CULTURES:  02-07 .Sputum Sputum conical XXXX   Few WBC's  Few squamous epithelial cells  Rare Gram Positive Cocci in Clusters  Rare Yeast like cells   Normal Respiratory Chely present    02-07 .Blood Blood-Peripheral XXXX XXXX   No growth to date.    02-07 .Urine Clean Catch (Midstream) XXXX XXXX   Culture grew 3 or more types of organisms which indicate  collection contamination; consider recollection only if clinically  indicated.    02-06 .Blood Blood-Peripheral XXXX XXXX   No growth to date.          RADIOLOGY & ADDITIONAL STUDIES:

## 2017-02-08 NOTE — PROGRESS NOTE ADULT - SUBJECTIVE AND OBJECTIVE BOX
INTERVAL HPI/OVERNIGHT EVENTS:  75 y/o female PMH significant for HTN, HLD, COPD/Emphysema(not on home O2), ex tobacco use presents with a three day history of increased shortness of breath and dyspnea on exertion different from her baseline. Reports it started after a cold like illness and since then has gotten worst. She had  taken 10 mg of prednisone left over from a previous COPD exacerbation and reports that her albuterol inhaler has not been as effective. Patient denies sick contacts or recent travel. Denies fever but has increased cough productive of green sputum which some times is difficult to cough up. Up to date with pneumo vaccine but did not get Flu vaccine.  Admitted with RUL pneumonia and COPD exacerbation. Smoked close to 40 years, quit 15 years ago.  less episodes of cough.    Vital Signs Last 24 Hrs  T(C): 37.1, Max: 38.9 (02-08 @ 00:07)  T(F): 98.8, Max: 102 (02-08 @ 00:07)  HR: 83 (80 - 110)  BP: 117/52 (109/54 - 162/65)  BP(mean): --  RR: 17 (17 - 18)  SpO2: 95% (92% - 97%)    PHYSICAL EXAM:  GEN:         Awake, responsive and comfortable.  HEENT:    Normal.    RESP:      no wheezing.  CVS:         Regular rate and rhythm.   ABD:         Soft, non-tender, non-distended;   :             No costovertebral angle tenderness  EXTR:            No clubbing, cyanosis or edema  CNS:              Intact sensory and motor function.    MEDICATIONS  (STANDING):  aspirin  chewable 81milliGRAM(s) Oral daily  atorvastatin 10milliGRAM(s) Oral at bedtime  enoxaparin Injectable 40milliGRAM(s) SubCutaneous every 24 hours  fluticasone / salmeterol 250-50 MICROgram(s) Diskus 1Dose(s) Inhalation two times a day  ALBUTerol/ipratropium for Nebulization 3milliLiter(s) Nebulizer every 6 hours  acetylcysteine 10% Inhalation 2milliLiter(s) Inhalation every 6 hours  levoFLOXacin IVPB 750milliGRAM(s) IV Intermittent every 24 hours  sodium chloride 0.9%. 1000milliLiter(s) IV Continuous <Continuous>  methylPREDNISolone sodium succinate Injectable 40milliGRAM(s) IV Push every 8 hours  guaiFENesin    Syrup 200milliGRAM(s) Oral once    MEDICATIONS  (PRN):  acetaminophen   Tablet 650milliGRAM(s) Oral every 6 hours PRN For Temp greater than 38 C (100.4 F)    LABS:                        11.9   25.9  )-----------( 275      ( 08 Feb 2017 08:25 )             33.6     08 Feb 2017 08:25    142    |  109    |  22     ----------------------------<  172    4.6     |  27     |  0.88     Ca    8.5        08 Feb 2017 08:25    TPro  6.4    /  Alb  2.6    /  TBili  0.4    /  DBili  x      /  AST  26     /  ALT  24     /  AlkPhos  62     08 Feb 2017 08:25    ASSESSMENT AND PLAN:  ·	RUL pneumonia  (CAP).  ·	Acute COPD exacerbation.  ·	SOB.  ·	Cough.  ·	Leukocytosis.  ·	UTI.  ·	HTN.  ·	HLD    DC Mucomyst .  Continue antibiotics and nebulizer.

## 2017-02-09 LAB
ALBUMIN SERPL ELPH-MCNC: 2.4 G/DL — LOW (ref 3.3–5)
ALP SERPL-CCNC: 71 U/L — SIGNIFICANT CHANGE UP (ref 40–120)
ALT FLD-CCNC: 26 U/L — SIGNIFICANT CHANGE UP (ref 12–78)
ANION GAP SERPL CALC-SCNC: 10 MMOL/L — SIGNIFICANT CHANGE UP (ref 5–17)
AST SERPL-CCNC: 20 U/L — SIGNIFICANT CHANGE UP (ref 15–37)
BILIRUB SERPL-MCNC: 0.3 MG/DL — SIGNIFICANT CHANGE UP (ref 0.2–1.2)
BUN SERPL-MCNC: 23 MG/DL — SIGNIFICANT CHANGE UP (ref 7–23)
CALCIUM SERPL-MCNC: 8.6 MG/DL — SIGNIFICANT CHANGE UP (ref 8.5–10.1)
CHLORIDE SERPL-SCNC: 108 MMOL/L — SIGNIFICANT CHANGE UP (ref 96–108)
CO2 SERPL-SCNC: 25 MMOL/L — SIGNIFICANT CHANGE UP (ref 22–31)
CREAT SERPL-MCNC: 0.84 MG/DL — SIGNIFICANT CHANGE UP (ref 0.5–1.3)
CRP SERPL-MCNC: 19.52 MG/DL — HIGH (ref 0–0.4)
CULTURE RESULTS: SIGNIFICANT CHANGE UP
GLUCOSE SERPL-MCNC: 163 MG/DL — HIGH (ref 70–99)
HCT VFR BLD CALC: 33.2 % — LOW (ref 34.5–45)
HGB BLD-MCNC: 11.7 G/DL — SIGNIFICANT CHANGE UP (ref 11.5–15.5)
LACTATE SERPL-SCNC: 1.3 MMOL/L — SIGNIFICANT CHANGE UP (ref 0.7–2)
M PNEUMO IGM SER-ACNC: <20 UNITS/ML — SIGNIFICANT CHANGE UP
MCHC RBC-ENTMCNC: 31.6 PG — SIGNIFICANT CHANGE UP (ref 27–34)
MCHC RBC-ENTMCNC: 35.3 GM/DL — SIGNIFICANT CHANGE UP (ref 32–36)
MCV RBC AUTO: 89.4 FL — SIGNIFICANT CHANGE UP (ref 80–100)
MYCOPLASMA AG SPEC QL: NEGATIVE — SIGNIFICANT CHANGE UP
PLATELET # BLD AUTO: 316 K/UL — SIGNIFICANT CHANGE UP (ref 150–400)
POTASSIUM SERPL-MCNC: 3.9 MMOL/L — SIGNIFICANT CHANGE UP (ref 3.5–5.3)
POTASSIUM SERPL-SCNC: 3.9 MMOL/L — SIGNIFICANT CHANGE UP (ref 3.5–5.3)
PROT SERPL-MCNC: 6.3 GM/DL — SIGNIFICANT CHANGE UP (ref 6–8.3)
RBC # BLD: 3.71 M/UL — LOW (ref 3.8–5.2)
RBC # FLD: 12.1 % — SIGNIFICANT CHANGE UP (ref 11–15)
SODIUM SERPL-SCNC: 143 MMOL/L — SIGNIFICANT CHANGE UP (ref 135–145)
SPECIMEN SOURCE: SIGNIFICANT CHANGE UP
WBC # BLD: 29 K/UL — HIGH (ref 3.8–10.5)
WBC # FLD AUTO: 29 K/UL — HIGH (ref 3.8–10.5)

## 2017-02-09 PROCEDURE — 99233 SBSQ HOSP IP/OBS HIGH 50: CPT

## 2017-02-09 RX ADMIN — Medication 40 MILLIGRAM(S): at 13:20

## 2017-02-09 RX ADMIN — SODIUM CHLORIDE 100 MILLILITER(S): 9 INJECTION INTRAMUSCULAR; INTRAVENOUS; SUBCUTANEOUS at 22:39

## 2017-02-09 RX ADMIN — Medication 3 MILLILITER(S): at 06:46

## 2017-02-09 RX ADMIN — Medication 200 MILLIGRAM(S): at 13:21

## 2017-02-09 RX ADMIN — Medication 40 MILLIGRAM(S): at 22:36

## 2017-02-09 RX ADMIN — Medication 3 MILLILITER(S): at 11:46

## 2017-02-09 RX ADMIN — Medication 3 MILLILITER(S): at 00:22

## 2017-02-09 RX ADMIN — Medication 81 MILLIGRAM(S): at 13:20

## 2017-02-09 RX ADMIN — FLUTICASONE PROPIONATE AND SALMETEROL 1 DOSE(S): 50; 250 POWDER ORAL; RESPIRATORY (INHALATION) at 17:58

## 2017-02-09 RX ADMIN — Medication 3 MILLILITER(S): at 17:35

## 2017-02-09 RX ADMIN — Medication 40 MILLIGRAM(S): at 05:43

## 2017-02-09 RX ADMIN — SODIUM CHLORIDE 100 MILLILITER(S): 9 INJECTION INTRAMUSCULAR; INTRAVENOUS; SUBCUTANEOUS at 05:44

## 2017-02-09 RX ADMIN — ENOXAPARIN SODIUM 40 MILLIGRAM(S): 100 INJECTION SUBCUTANEOUS at 17:58

## 2017-02-09 RX ADMIN — Medication 100 MILLIGRAM(S): at 13:20

## 2017-02-09 RX ADMIN — Medication 100 MILLIGRAM(S): at 17:58

## 2017-02-09 RX ADMIN — Medication 100 MILLIGRAM(S): at 22:44

## 2017-02-09 RX ADMIN — ATORVASTATIN CALCIUM 10 MILLIGRAM(S): 80 TABLET, FILM COATED ORAL at 22:36

## 2017-02-09 NOTE — PROGRESS NOTE ADULT - ASSESSMENT
NTERVAL HPI/OVERNIGHT EVENTS:  75 y/o female PMH significant for HTN, HLD, COPD/Emphysema(not on home O2), ex tobacco use presents with a three day history of increased shortness of breath and dyspnea on exertion different from her baseline. Reports it started after a cold like illness and since then has gotten worst. She had  taken 10 mg of prednisone left over from a previous COPD exacerbation and reports that her albuterol inhaler has not been as effective. Patient denies sick contacts or recent travel. Denies fever but has increased cough productive of green sputum which some times is difficult to cough up. Up to date with pneumo vaccine but did not get Flu vaccine.  Admitted with RUL pneumonia and COPD exacerbation. Smoked close to 40 years, quit 15 years ago.  less episodes of cough.

## 2017-02-09 NOTE — PROGRESS NOTE ADULT - SUBJECTIVE AND OBJECTIVE BOX
Patient is a 76y old  Female who presents with a chief complaint of Increasing shortnesss of breath (06 Feb 2017 14:55)      INTERVAL HPI/OVERNIGHT EVENTS: continues to have oxygen requirement with shortness of breath     MEDICATIONS  (STANDING):  aspirin  chewable 81milliGRAM(s) Oral daily  atorvastatin 10milliGRAM(s) Oral at bedtime  enoxaparin Injectable 40milliGRAM(s) SubCutaneous every 24 hours  fluticasone / salmeterol 250-50 MICROgram(s) Diskus 1Dose(s) Inhalation two times a day  ALBUTerol/ipratropium for Nebulization 3milliLiter(s) Nebulizer every 6 hours  levoFLOXacin IVPB 750milliGRAM(s) IV Intermittent every 24 hours  sodium chloride 0.9%. 1000milliLiter(s) IV Continuous <Continuous>  methylPREDNISolone sodium succinate Injectable 40milliGRAM(s) IV Push every 8 hours  benzonatate 100milliGRAM(s) Oral four times a day    MEDICATIONS  (PRN):  acetaminophen   Tablet 650milliGRAM(s) Oral every 6 hours PRN For Temp greater than 38 C (100.4 F)  guaiFENesin    Syrup 200milliGRAM(s) Oral every 6 hours PRN Cough      Allergies    penicillin (Hives; Rash)    Intolerances        REVIEW OF SYSTEMS:  CONSTITUTIONAL:fever fatigue  EYES: No eye pain, visual disturbances, or discharge  ENMT:  No difficulty hearing, tinnitus, vertigo; No sinus or throat pain  NECK: No pain or stiffness  BREASTS: No pain, masses, or nipple discharge  RESPIRATORY: wheezing with shortness of breath   CARDIOVASCULAR: No chest pain, palpitations, dizziness, or leg swelling  GASTROINTESTINAL: No abdominal or epigastric pain. No nausea, vomiting, or hematemesis; No diarrhea or constipation. No melena or hematochezia.  GENITOURINARY: No dysuria, frequency, hematuria, or incontinence  NEUROLOGICAL: No headaches, memory loss, loss of strength, numbness, or tremors  SKIN: No itching, burning, rashes, or lesions   LYMPH NODES: No enlarged glands  ENDOCRINE: No heat or cold intolerance; No hair loss  MUSCULOSKELETAL: No joint pain or swelling; No muscle, back, or extremity pain  PSYCHIATRIC: No depression, anxiety, mood swings, or difficulty sleeping  HEME/LYMPH: No easy bruising, or bleeding gums  ALLERGY AND IMMUNOLOGIC: No hives or eczema    Vital Signs Last 24 Hrs  T(C): 36.7, Max: 37.8 (02-09 @ 00:03)  T(F): 98.1, Max: 100 (02-09 @ 00:03)  HR: 113 (80 - 114)  BP: 153/70 (136/64 - 171/80)  BP(mean): --  RR: 18 (18 - 18)  SpO2: 94% (92% - 95%)    PHYSICAL EXAM:  GENERAL: NAD, well-groomed, well-developed  HEAD:  Atraumatic, Normocephalic  EYES: EOMI, PERRLA, conjunctiva and sclera clear  ENMT: No tonsillar erythema, exudates, or enlargement; Moist mucous membranes, Good dentition, No lesions  NECK: Supple, No JVD, Normal thyroid  NERVOUS SYSTEM:  Alert & Oriented X3, Good concentration; Motor Strength 5/5 B/L upper and lower extremities; DTRs 2+ intact and symmetric  CHEST/LUNG: b/l wheezing with decreased breath sounds   HEART: Regular rate and rhythm; No murmurs, rubs, or gallops  ABDOMEN: Soft, Nontender, Nondistended; Bowel sounds present  EXTREMITIES:  2+ Peripheral Pulses, No clubbing, cyanosis, or edema  LYMPH: No lymphadenopathy noted  SKIN: No rashes or lesions    LABS:                        11.7   29.0  )-----------( 316      ( 09 Feb 2017 08:58 )             33.2     09 Feb 2017 08:58    143    |  108    |  23     ----------------------------<  163    3.9     |  25     |  0.84     Ca    8.6        09 Feb 2017 08:58    TPro  6.3    /  Alb  2.4    /  TBili  0.3    /  DBili  x      /  AST  20     /  ALT  26     /  AlkPhos  71     09 Feb 2017 08:58        CAPILLARY BLOOD GLUCOSE      RADIOLOGY & ADDITIONAL TESTS:    Imaging Personally Reviewed:  [ ] YES  [ ] NO    Consultant(s) Notes Reviewed:  [ ] YES  [ ] NO    Care Discussed with Consultants/Other Providers [ ] YES  [ ] NO

## 2017-02-10 VITALS — OXYGEN SATURATION: 93 %

## 2017-02-10 LAB
ALBUMIN SERPL ELPH-MCNC: 2.1 G/DL — LOW (ref 3.3–5)
ALP SERPL-CCNC: 74 U/L — SIGNIFICANT CHANGE UP (ref 40–120)
ALT FLD-CCNC: 29 U/L — SIGNIFICANT CHANGE UP (ref 12–78)
ANION GAP SERPL CALC-SCNC: 8 MMOL/L — SIGNIFICANT CHANGE UP (ref 5–17)
AST SERPL-CCNC: 18 U/L — SIGNIFICANT CHANGE UP (ref 15–37)
BILIRUB SERPL-MCNC: 0.3 MG/DL — SIGNIFICANT CHANGE UP (ref 0.2–1.2)
BUN SERPL-MCNC: 23 MG/DL — SIGNIFICANT CHANGE UP (ref 7–23)
CALCIUM SERPL-MCNC: 8.6 MG/DL — SIGNIFICANT CHANGE UP (ref 8.5–10.1)
CHLORIDE SERPL-SCNC: 107 MMOL/L — SIGNIFICANT CHANGE UP (ref 96–108)
CO2 SERPL-SCNC: 27 MMOL/L — SIGNIFICANT CHANGE UP (ref 22–31)
CREAT SERPL-MCNC: 0.76 MG/DL — SIGNIFICANT CHANGE UP (ref 0.5–1.3)
GLUCOSE SERPL-MCNC: 195 MG/DL — HIGH (ref 70–99)
HCT VFR BLD CALC: 33.9 % — LOW (ref 34.5–45)
HGB BLD-MCNC: 11.8 G/DL — SIGNIFICANT CHANGE UP (ref 11.5–15.5)
MCHC RBC-ENTMCNC: 31.8 PG — SIGNIFICANT CHANGE UP (ref 27–34)
MCHC RBC-ENTMCNC: 34.7 GM/DL — SIGNIFICANT CHANGE UP (ref 32–36)
MCV RBC AUTO: 91.7 FL — SIGNIFICANT CHANGE UP (ref 80–100)
PLATELET # BLD AUTO: 300 K/UL — SIGNIFICANT CHANGE UP (ref 150–400)
POTASSIUM SERPL-MCNC: 3.8 MMOL/L — SIGNIFICANT CHANGE UP (ref 3.5–5.3)
POTASSIUM SERPL-SCNC: 3.8 MMOL/L — SIGNIFICANT CHANGE UP (ref 3.5–5.3)
PROT SERPL-MCNC: 6.2 GM/DL — SIGNIFICANT CHANGE UP (ref 6–8.3)
RBC # BLD: 3.7 M/UL — LOW (ref 3.8–5.2)
RBC # FLD: 13.3 % — SIGNIFICANT CHANGE UP (ref 11–15)
SODIUM SERPL-SCNC: 142 MMOL/L — SIGNIFICANT CHANGE UP (ref 135–145)
WBC # BLD: 33.8 K/UL — HIGH (ref 3.8–10.5)
WBC # FLD AUTO: 33.8 K/UL — HIGH (ref 3.8–10.5)

## 2017-02-10 PROCEDURE — 99232 SBSQ HOSP IP/OBS MODERATE 35: CPT

## 2017-02-10 PROCEDURE — 99239 HOSP IP/OBS DSCHRG MGMT >30: CPT

## 2017-02-10 RX ORDER — ACETAMINOPHEN 500 MG
2 TABLET ORAL
Qty: 0 | Refills: 0 | COMMUNITY
Start: 2017-02-10

## 2017-02-10 RX ORDER — FLUTICASONE PROPIONATE AND SALMETEROL 50; 250 UG/1; UG/1
0 POWDER ORAL; RESPIRATORY (INHALATION)
Qty: 0 | Refills: 0 | COMMUNITY

## 2017-02-10 RX ORDER — IPRATROPIUM/ALBUTEROL SULFATE 18-103MCG
3 AEROSOL WITH ADAPTER (GRAM) INHALATION
Qty: 0 | Refills: 0 | COMMUNITY
Start: 2017-02-10

## 2017-02-10 RX ORDER — TIOTROPIUM BROMIDE 18 UG/1
1 CAPSULE ORAL; RESPIRATORY (INHALATION)
Qty: 30 | Refills: 0 | OUTPATIENT
Start: 2017-02-10 | End: 2017-03-12

## 2017-02-10 RX ADMIN — Medication 100 MILLIGRAM(S): at 05:48

## 2017-02-10 RX ADMIN — Medication 40 MILLIGRAM(S): at 13:23

## 2017-02-10 RX ADMIN — Medication 100 MILLIGRAM(S): at 13:23

## 2017-02-10 RX ADMIN — Medication 3 MILLILITER(S): at 11:14

## 2017-02-10 RX ADMIN — Medication 81 MILLIGRAM(S): at 13:23

## 2017-02-10 RX ADMIN — Medication 3 MILLILITER(S): at 06:17

## 2017-02-10 RX ADMIN — Medication 3 MILLILITER(S): at 00:42

## 2017-02-10 RX ADMIN — Medication 40 MILLIGRAM(S): at 05:44

## 2017-02-10 RX ADMIN — Medication 200 MILLIGRAM(S): at 13:24

## 2017-02-10 NOTE — DISCHARGE NOTE ADULT - MEDICATION SUMMARY - MEDICATIONS TO STOP TAKING
I will STOP taking the medications listed below when I get home from the hospital:    ciprofloxacin 500 mg oral tablet  -- 1 tab(s) by mouth every 12 hours    predniSONE 10 mg oral tablet  -- 1 tab(s) by mouth once a day

## 2017-02-10 NOTE — PROGRESS NOTE ADULT - PROBLEM SELECTOR PROBLEM 1
Pneumonia of right upper lobe due to infectious organism

## 2017-02-10 NOTE — DISCHARGE NOTE ADULT - HOSPITAL COURSE
5 y/o female PMH significant for COPD/Emphysema ex tobacco use presents with a three day history of increased shortness of breath and dyspnea on exertion different from her baseline. She had  taken 10 mg of prednisone left over from a previous COPD exacerbation and reports that her albuterol inhaler has not been as effective. Patient denies sick contacts or recent travel has two pets at home (dog and bird). Patient denies fever but has increased cough productive of green sputum   Patient denies new chest pain       Patient was found to have a right upper lobe pneumonia treated with a course of Levaquin and steroid dose for her emphysema   patient did have an oxygen requirement while in the hospital but declined home 02       ROCEDURE DATE:  02/06/2017        INTERPRETATION:  CLINICAL INFORMATION:copd    TECHNIQUE: AP chest film. Comparison is made to 11/10/2015    FINDINGS: There are patchy opacities in the right upper lobe.  Heart size   is within normal limits. The osseous structures are intact.    IMPRESSION: New right upper lobe patchy opacities compatible with   pneumonia.      PATIENT TO FOLLOW up with her PMD and her lung doctor through [facility]

## 2017-02-10 NOTE — DISCHARGE NOTE ADULT - PLAN OF CARE
improve with antibiotics patient to follow up with PMD and her Lung doctor active COPD will follow up with her lung doctor

## 2017-02-10 NOTE — DISCHARGE NOTE ADULT - CARE PLAN
Principal Discharge DX:	Pneumonia of right upper lobe due to infectious organism  Goal:	improve with antibiotics  Instructions for follow-up, activity and diet:	patient to follow up with PMD and her Lung doctor  Secondary Diagnosis:	COPD exacerbation  Secondary Diagnosis:	Pulmonary emphysema, unspecified emphysema type  Goal:	active COPD will follow up with her lung doctor  Secondary Diagnosis:	Hypertension

## 2017-02-10 NOTE — PROGRESS NOTE ADULT - PROBLEM SELECTOR PLAN 1
clincally feeeling better  cont Levaquin,switch to oral
Symptomatically improving will contine IV abx for one day with expected discharge on Wednesday with po course of levaquin   WBC elevation secondary to acute infection and po steroid dose
will contuinue abx and follow cultures still with o2 requirement  and low grade fever elevate CRP  -add guanefesin and tessalon
clincally feeeling better  cont Levaquin

## 2017-02-10 NOTE — DISCHARGE NOTE ADULT - CARE PROVIDER_API CALL
Ben Santillan), Internal Medicine  [address] [place] [address]  Phone: [phone]  Fax: [phone]

## 2017-02-10 NOTE — PROGRESS NOTE ADULT - PROBLEM SELECTOR PROBLEM 2
Leukocytosis, unspecified type
Leukocytosis, unspecified type
Pulmonary emphysema, unspecified emphysema type
Pulmonary emphysema, unspecified emphysema type

## 2017-02-10 NOTE — DISCHARGE NOTE ADULT - PATIENT PORTAL LINK FT
“You can access the [facility] Patient Portal, offered by [facility], by registering with the following website: [email or web address]”

## 2017-02-10 NOTE — DISCHARGE NOTE ADULT - MEDICATION SUMMARY - MEDICATIONS TO TAKE
I will START or STAY ON the medications listed below when I get home from the hospital:    Deltasone 20 mg oral tablet  -- 1 tab(s) by mouth once a day -for bronchospasm  -- It is very important that you take or use this exactly as directed.  Do not skip doses or discontinue unless directed by your doctor.  Obtain medical advice before taking any non-prescription drugs as some may affect the action of this medication.  Take with food or milk.    -- Indication: For breathing     acetaminophen 325 mg oral tablet  -- 2 tab(s) by mouth every 6 hours, As needed, For Temp greater than 38 C (100.4 F)  -- Indication: For Fever     aspirin 81 mg oral tablet, chewable  -- 1 tab(s) by mouth once a day  -- Indication: For Heart    atorvastatin 10 mg oral tablet  -- 1 tab(s) by mouth once a day (at bedtime)  -- Indication: For Heart    benzonatate 100 mg oral capsule  -- 1 cap(s) by mouth 4 times a day  -- Indication: For COugh    albuterol CFC free 90 mcg/inh inhalation aerosol  -- 2 puff(s) inhaled every 6 hours, As needed, Shortness of Breath  -- Indication: For breathing    albuterol-ipratropium 2.5 mg-0.5 mg/3 mL inhalation solution  -- 3 milliliter(s) inhaled every 4 hours  -- Indication: For breathing    ProAir HFA 90 mcg/inh inhalation aerosol  -- 2 puff(s) inhaled 4 times a day  -- Indication: For breathing    Spiriva 18 mcg inhalation capsule  -- 1 cap(s) inhaled once a day  -- Check with your doctor before becoming pregnant.  For inhalation only.  It is very important that you take or use this exactly as directed.  Do not skip doses or discontinue unless directed by your doctor.  Obtain medical advice before taking any non-prescription drugs as some may affect the action of this medication.    -- Indication: For breathing    guaiFENesin 100 mg/5 mL oral liquid  -- 10 milliliter(s) by mouth every 6 hours, As needed, Cough  -- Indication: For COugh    Levaquin 750 mg oral tablet  -- 1 tab(s) by mouth once a day  -- Avoid prolonged or excessive exposure to direct and/or artificial sunlight while taking this medication.  Do not take dairy products, antacids, or iron preparations within one hour of this medication.  Finish all this medication unless otherwise directed by prescriber.  May cause drowsiness or dizziness.  Medication should be taken with plenty of water.    -- Indication: For Pneumonia    Mucinex D Max Strength 120 mg-1200 mg oral tablet, extended release  -- 1 tab(s) by mouth 2 times a day  -- Medication should be taken with plenty of water.  Swallow whole.  Do not crush.    -- Indication: For COugh    guaiFENesin-dextromethorphan 100mg-10mg/5 mL oral liquid  -- 10 milliliter(s) by mouth every 4 hours  -- Medication should be taken with plenty of water.    -- Indication: For COugh

## 2017-02-10 NOTE — PROGRESS NOTE ADULT - SUBJECTIVE AND OBJECTIVE BOX
Patient is a 76y old  Female who presents with a chief complaint of Increasing shortness of breath (10 Feb 2017 11:38)      INTERVAL HPI / OVERNIGHT EVENTS: breathing slightly better, cough continues, no fever    MEDICATIONS  (STANDING):  aspirin  chewable 81milliGRAM(s) Oral daily  atorvastatin 10milliGRAM(s) Oral at bedtime  enoxaparin Injectable 40milliGRAM(s) SubCutaneous every 24 hours  fluticasone / salmeterol 250-50 MICROgram(s) Diskus 1Dose(s) Inhalation two times a day  ALBUTerol/ipratropium for Nebulization 3milliLiter(s) Nebulizer every 6 hours  levoFLOXacin IVPB 750milliGRAM(s) IV Intermittent every 24 hours  sodium chloride 0.9%. 1000milliLiter(s) IV Continuous <Continuous>  methylPREDNISolone sodium succinate Injectable 40milliGRAM(s) IV Push every 8 hours  benzonatate 100milliGRAM(s) Oral four times a day    MEDICATIONS  (PRN):  acetaminophen   Tablet 650milliGRAM(s) Oral every 6 hours PRN For Temp greater than 38 C (100.4 F)  guaiFENesin    Syrup 200milliGRAM(s) Oral every 6 hours PRN Cough  oxyCODONE  5 mG/acetaminophen 325 mG 1Tablet(s) Oral every 6 hours PRN Severe Pain (7 - 10)      Vital Signs Last 24 Hrs  T(C): 36.9, Max: 37.2 (02-09 @ 23:20)  T(F): 98.4, Max: 99 (02-09 @ 23:20)  HR: 92 (80 - 109)  BP: 139/72 (126/85 - 139/72)  BP(mean): --  RR: 17 (17 - 18)  SpO2: 93% (86% - 95%)    PHYSICAL EXAM:    Constitutional: NAD, well-groomed, well-developed  Respiratory: exoiratory wheezes present in all lung fields  Cardiovascular: S1 and S2, RRR, no M/G/R  Gastrointestinal: BS+, soft, NT/ND  Extremities: No peripheral edema  Vascular: 2+ peripheral pulses  Skin: No rashes      LABS:                        11.8   33.8  )-----------( 300      ( 10 Feb 2017 08:38 )             33.9     10 Feb 2017 08:38    142    |  107    |  23     ----------------------------<  195    3.8     |  27     |  0.76     Ca    8.6        10 Feb 2017 08:38    TPro  6.2    /  Alb  2.1    /  TBili  0.3    /  DBili  x      /  AST  18     /  ALT  29     /  AlkPhos  74     10 Feb 2017 08:38            MICROBIOLOGY:  RECENT CULTURES:  02-08 .Urine Clean Catch (Midstream) XXXX XXXX   <10,000 CFU/ml  Normal Urogenital alberto present    02-08 .Blood Blood-Peripheral XXXX XXXX   No growth to date.    02-07 .Sputum Sputum conical XXXX   Few WBC's  Few squamous epithelial cells  Rare Gram Positive Cocci in Clusters  Rare Yeast like cells   Rare Mold like fungus  Normal Respiratory Alberto present    02-07 .Blood Blood-Peripheral XXXX XXXX   No growth to date.    02-07 .Urine Clean Catch (Midstream) XXXX XXXX   Culture grew 3 or more types of organisms which indicate  collection contamination; consider recollection only if clinically  indicated.    02-06 .Blood Blood-Peripheral XXXX XXXX   No growth to date.          RADIOLOGY & ADDITIONAL STUDIES:

## 2017-02-10 NOTE — PROGRESS NOTE ADULT - PROBLEM SELECTOR PLAN 3
nebs,oxygen an steroids
appreciate juanito consult continue steroids
controlled
nebs,oxygen an steroids

## 2017-02-10 NOTE — PROGRESS NOTE ADULT - PROBLEM SELECTOR PLAN 4
resolved  BC neg
continue statin therapy
X 1 today   As pt feeling better not broadening antibiotic coverage  Sputum normal alberto as well  BC neg

## 2017-02-11 LAB
CULTURE RESULTS: SIGNIFICANT CHANGE UP
SPECIMEN SOURCE: SIGNIFICANT CHANGE UP

## 2017-02-12 ENCOUNTER — INPATIENT (INPATIENT)
Facility: HOSPITAL | Age: 77
LOS: 6 days | Discharge: HOME | End: 2017-02-19
Attending: HOSPITALIST | Admitting: HOSPITALIST
Payer: COMMERCIAL

## 2017-02-12 VITALS
WEIGHT: 139.99 LBS | DIASTOLIC BLOOD PRESSURE: 75 MMHG | SYSTOLIC BLOOD PRESSURE: 154 MMHG | HEIGHT: 65 IN | OXYGEN SATURATION: 86 % | RESPIRATION RATE: 20 BRPM | HEART RATE: 107 BPM | TEMPERATURE: 99 F

## 2017-02-12 DIAGNOSIS — J44.0 CHRONIC OBSTRUCTIVE PULMONARY DISEASE WITH ACUTE LOWER RESPIRATORY INFECTION: ICD-10-CM

## 2017-02-12 DIAGNOSIS — D72.829 ELEVATED WHITE BLOOD CELL COUNT, UNSPECIFIED: ICD-10-CM

## 2017-02-12 DIAGNOSIS — Z78.9 OTHER SPECIFIED HEALTH STATUS: ICD-10-CM

## 2017-02-12 DIAGNOSIS — I10 ESSENTIAL (PRIMARY) HYPERTENSION: ICD-10-CM

## 2017-02-12 DIAGNOSIS — J96.01 ACUTE RESPIRATORY FAILURE WITH HYPOXIA: ICD-10-CM

## 2017-02-12 DIAGNOSIS — B00.9 HERPESVIRAL INFECTION, UNSPECIFIED: ICD-10-CM

## 2017-02-12 DIAGNOSIS — J43.1 PANLOBULAR EMPHYSEMA: ICD-10-CM

## 2017-02-12 DIAGNOSIS — J18.9 PNEUMONIA, UNSPECIFIED ORGANISM: ICD-10-CM

## 2017-02-12 DIAGNOSIS — E78.00 PURE HYPERCHOLESTEROLEMIA, UNSPECIFIED: ICD-10-CM

## 2017-02-12 PROBLEM — J44.9 CHRONIC OBSTRUCTIVE PULMONARY DISEASE, UNSPECIFIED: Chronic | Status: ACTIVE | Noted: 2017-02-06

## 2017-02-12 LAB
ALBUMIN SERPL ELPH-MCNC: 1.9 G/DL — LOW (ref 3.3–5)
ALP SERPL-CCNC: 112 U/L — SIGNIFICANT CHANGE UP (ref 40–120)
ALT FLD-CCNC: 86 U/L — HIGH (ref 12–78)
ANION GAP SERPL CALC-SCNC: 9 MMOL/L — SIGNIFICANT CHANGE UP (ref 5–17)
ANISOCYTOSIS BLD QL: SLIGHT — SIGNIFICANT CHANGE UP
APTT BLD: 27.9 SEC — SIGNIFICANT CHANGE UP (ref 27.5–37.4)
AST SERPL-CCNC: 45 U/L — HIGH (ref 15–37)
BASE EXCESS BLDA CALC-SCNC: 5.3 MMOL/L — HIGH (ref -2–2)
BILIRUB SERPL-MCNC: 0.4 MG/DL — SIGNIFICANT CHANGE UP (ref 0.2–1.2)
BLOOD GAS COMMENTS: SIGNIFICANT CHANGE UP
BLOOD GAS COMMENTS: SIGNIFICANT CHANGE UP
BLOOD GAS SOURCE: SIGNIFICANT CHANGE UP
BUN SERPL-MCNC: 23 MG/DL — SIGNIFICANT CHANGE UP (ref 7–23)
CALCIUM SERPL-MCNC: 8.8 MG/DL — SIGNIFICANT CHANGE UP (ref 8.5–10.1)
CHLORIDE SERPL-SCNC: 100 MMOL/L — SIGNIFICANT CHANGE UP (ref 96–108)
CK MB BLD-MCNC: 2.7 % — SIGNIFICANT CHANGE UP (ref 0–3.5)
CK MB CFR SERPL CALC: 2 NG/ML — SIGNIFICANT CHANGE UP (ref 0.5–3.6)
CK SERPL-CCNC: 74 U/L — SIGNIFICANT CHANGE UP (ref 26–192)
CO2 SERPL-SCNC: 30 MMOL/L — SIGNIFICANT CHANGE UP (ref 22–31)
CREAT SERPL-MCNC: 0.75 MG/DL — SIGNIFICANT CHANGE UP (ref 0.5–1.3)
CULTURE RESULTS: SIGNIFICANT CHANGE UP
GLUCOSE SERPL-MCNC: 131 MG/DL — HIGH (ref 70–99)
HCO3 BLDA-SCNC: 30 MMOL/L — HIGH (ref 21–29)
HCT VFR BLD CALC: 35.6 % — SIGNIFICANT CHANGE UP (ref 34.5–45)
HGB BLD-MCNC: 12.2 G/DL — SIGNIFICANT CHANGE UP (ref 11.5–15.5)
HOROWITZ INDEX BLDA+IHG-RTO: 32 — SIGNIFICANT CHANGE UP
HYPOCHROMIA BLD QL: SLIGHT — SIGNIFICANT CHANGE UP
INR BLD: 1.37 RATIO — HIGH (ref 0.88–1.16)
LACTATE SERPL-SCNC: 1.2 MMOL/L — SIGNIFICANT CHANGE UP (ref 0.7–2)
LYMPHOCYTES # BLD AUTO: 5 % — LOW (ref 13–44)
MCHC RBC-ENTMCNC: 30.4 PG — SIGNIFICANT CHANGE UP (ref 27–34)
MCHC RBC-ENTMCNC: 34.3 GM/DL — SIGNIFICANT CHANGE UP (ref 32–36)
MCV RBC AUTO: 88.8 FL — SIGNIFICANT CHANGE UP (ref 80–100)
MICROCYTES BLD QL: SLIGHT — SIGNIFICANT CHANGE UP
MONOCYTES NFR BLD AUTO: 1 % — LOW (ref 2–14)
NEUTROPHILS NFR BLD AUTO: 88 % — HIGH (ref 43–77)
NEUTS BAND # BLD: 5 % — SIGNIFICANT CHANGE UP (ref 0–8)
PCO2 BLDA: 46 MMHG — SIGNIFICANT CHANGE UP (ref 32–46)
PH BLD: 7.43 — SIGNIFICANT CHANGE UP (ref 7.35–7.45)
PLAT MORPH BLD: NORMAL — SIGNIFICANT CHANGE UP
PLATELET # BLD AUTO: 405 K/UL — HIGH (ref 150–400)
PO2 BLDA: 87 MMHG — SIGNIFICANT CHANGE UP (ref 74–108)
POLYCHROMASIA BLD QL SMEAR: SLIGHT — SIGNIFICANT CHANGE UP
POTASSIUM SERPL-MCNC: 3.7 MMOL/L — SIGNIFICANT CHANGE UP (ref 3.5–5.3)
POTASSIUM SERPL-SCNC: 3.7 MMOL/L — SIGNIFICANT CHANGE UP (ref 3.5–5.3)
PROT SERPL-MCNC: 6.4 GM/DL — SIGNIFICANT CHANGE UP (ref 6–8.3)
PROTHROM AB SERPL-ACNC: 15.4 SEC — HIGH (ref 10–13.1)
RBC # BLD: 4.01 M/UL — SIGNIFICANT CHANGE UP (ref 3.8–5.2)
RBC # FLD: 12.9 % — SIGNIFICANT CHANGE UP (ref 11–15)
RBC BLD AUTO: ABNORMAL
SAO2 % BLDA: 97 % — HIGH (ref 92–96)
SODIUM SERPL-SCNC: 139 MMOL/L — SIGNIFICANT CHANGE UP (ref 135–145)
SPECIMEN SOURCE: SIGNIFICANT CHANGE UP
TROPONIN I SERPL-MCNC: <.015 NG/ML — SIGNIFICANT CHANGE UP (ref 0.01–0.04)
VARIANT LYMPHS # BLD: 1 % — SIGNIFICANT CHANGE UP (ref 0–6)
WBC # BLD: 40.7 K/UL — CRITICAL HIGH (ref 3.8–10.5)
WBC # FLD AUTO: 40.7 K/UL — CRITICAL HIGH (ref 3.8–10.5)

## 2017-02-12 PROCEDURE — 99285 EMERGENCY DEPT VISIT HI MDM: CPT

## 2017-02-12 PROCEDURE — 71010: CPT | Mod: 26

## 2017-02-12 PROCEDURE — 99223 1ST HOSP IP/OBS HIGH 75: CPT

## 2017-02-12 RX ORDER — VANCOMYCIN HCL 1 G
1000 VIAL (EA) INTRAVENOUS ONCE
Qty: 0 | Refills: 0 | Status: COMPLETED | OUTPATIENT
Start: 2017-02-12 | End: 2017-02-12

## 2017-02-12 RX ORDER — AZITHROMYCIN 500 MG/1
500 TABLET, FILM COATED ORAL ONCE
Qty: 0 | Refills: 0 | Status: COMPLETED | OUTPATIENT
Start: 2017-02-12 | End: 2017-02-12

## 2017-02-12 RX ORDER — ENOXAPARIN SODIUM 100 MG/ML
40 INJECTION SUBCUTANEOUS DAILY
Qty: 0 | Refills: 0 | Status: DISCONTINUED | OUTPATIENT
Start: 2017-02-12 | End: 2017-02-19

## 2017-02-12 RX ORDER — AZTREONAM 2 G
VIAL (EA) INJECTION
Qty: 0 | Refills: 0 | Status: DISCONTINUED | OUTPATIENT
Start: 2017-02-12 | End: 2017-02-13

## 2017-02-12 RX ORDER — ATORVASTATIN CALCIUM 80 MG/1
10 TABLET, FILM COATED ORAL AT BEDTIME
Qty: 0 | Refills: 0 | Status: DISCONTINUED | OUTPATIENT
Start: 2017-02-12 | End: 2017-02-19

## 2017-02-12 RX ORDER — ACETAMINOPHEN 500 MG
650 TABLET ORAL EVERY 6 HOURS
Qty: 0 | Refills: 0 | Status: DISCONTINUED | OUTPATIENT
Start: 2017-02-12 | End: 2017-02-19

## 2017-02-12 RX ORDER — AZTREONAM 2 G
1000 VIAL (EA) INJECTION ONCE
Qty: 0 | Refills: 0 | Status: COMPLETED | OUTPATIENT
Start: 2017-02-12 | End: 2017-02-12

## 2017-02-12 RX ORDER — ASPIRIN/CALCIUM CARB/MAGNESIUM 324 MG
81 TABLET ORAL DAILY
Qty: 0 | Refills: 0 | Status: DISCONTINUED | OUTPATIENT
Start: 2017-02-12 | End: 2017-02-19

## 2017-02-12 RX ORDER — IPRATROPIUM/ALBUTEROL SULFATE 18-103MCG
3 AEROSOL WITH ADAPTER (GRAM) INHALATION EVERY 6 HOURS
Qty: 0 | Refills: 0 | Status: DISCONTINUED | OUTPATIENT
Start: 2017-02-12 | End: 2017-02-19

## 2017-02-12 RX ORDER — ALBUTEROL 90 UG/1
1 AEROSOL, METERED ORAL EVERY 4 HOURS
Qty: 0 | Refills: 0 | Status: DISCONTINUED | OUTPATIENT
Start: 2017-02-12 | End: 2017-02-19

## 2017-02-12 RX ORDER — TIOTROPIUM BROMIDE 18 UG/1
1 CAPSULE ORAL; RESPIRATORY (INHALATION) DAILY
Qty: 0 | Refills: 0 | Status: DISCONTINUED | OUTPATIENT
Start: 2017-02-12 | End: 2017-02-19

## 2017-02-12 RX ORDER — AZTREONAM 2 G
1000 VIAL (EA) INJECTION EVERY 8 HOURS
Qty: 0 | Refills: 0 | Status: DISCONTINUED | OUTPATIENT
Start: 2017-02-12 | End: 2017-02-13

## 2017-02-12 RX ORDER — VANCOMYCIN HCL 1 G
1000 VIAL (EA) INTRAVENOUS EVERY 12 HOURS
Qty: 0 | Refills: 0 | Status: DISCONTINUED | OUTPATIENT
Start: 2017-02-12 | End: 2017-02-19

## 2017-02-12 RX ORDER — AZITHROMYCIN 500 MG/1
50 TABLET, FILM COATED ORAL ONCE
Qty: 0 | Refills: 0 | Status: DISCONTINUED | OUTPATIENT
Start: 2017-02-12 | End: 2017-02-12

## 2017-02-12 RX ADMIN — Medication 250 MILLIGRAM(S): at 11:42

## 2017-02-12 RX ADMIN — Medication 125 MILLIGRAM(S): at 11:43

## 2017-02-12 RX ADMIN — AZITHROMYCIN 255 MILLIGRAM(S): 500 TABLET, FILM COATED ORAL at 13:09

## 2017-02-12 RX ADMIN — Medication 3 MILLILITER(S): at 18:17

## 2017-02-12 RX ADMIN — Medication 50 MILLIGRAM(S): at 16:44

## 2017-02-12 RX ADMIN — ENOXAPARIN SODIUM 40 MILLIGRAM(S): 100 INJECTION SUBCUTANEOUS at 13:23

## 2017-02-12 RX ADMIN — Medication 50 MILLIGRAM(S): at 22:45

## 2017-02-12 RX ADMIN — Medication 100 MILLIGRAM(S): at 16:45

## 2017-02-12 RX ADMIN — Medication 3 MILLILITER(S): at 23:22

## 2017-02-12 RX ADMIN — ATORVASTATIN CALCIUM 10 MILLIGRAM(S): 80 TABLET, FILM COATED ORAL at 22:45

## 2017-02-12 RX ADMIN — Medication 100 MILLIGRAM(S): at 22:45

## 2017-02-12 RX ADMIN — Medication 81 MILLIGRAM(S): at 16:44

## 2017-02-12 RX ADMIN — Medication 250 MILLIGRAM(S): at 23:26

## 2017-02-12 NOTE — H&P ADULT. - ASSESSMENT
77yo woman with recent PNA RUL, COPD/Emphysema, ex tobacco use,   discharged 2 days ago. presenting to ED due to SOB and found to have worsening PNA.

## 2017-02-12 NOTE — ED ADULT TRIAGE NOTE - CHIEF COMPLAINT QUOTE
pt c/o difficulty breathing since Friday after being discharged with pneumonia. pt c/o pain to chest with coughing

## 2017-02-12 NOTE — H&P ADULT. - ENT GEN HX ROS MEA POS PC
nasal discharge/dry mouth/nasal congestion/sore throat/post-nasal discharge upper lip/recurrent cold sores

## 2017-02-12 NOTE — H&P ADULT. - RS GEN PE MLT RESP DETAILS PC
airway patent/wheezes/rhonchi/diminished breath sounds, L/diminished breath sounds, R/respirations labored no wheezes/diminished breath sounds, R/diminished breath sounds, L/rhonchi

## 2017-02-12 NOTE — GOALS OF CARE CONVERSATION - PERSONAL ADVANCE DIRECTIVE - CONVERSATION DETAILS
Pt aware of COPD but has never thought about CPR or intubation options. She is aware that COPD will not improve in the long-term.   In short-term, knows that PNA is treatable and wants full CPR and intubation at this point.

## 2017-02-12 NOTE — ED PROVIDER NOTE - OBJECTIVE STATEMENT
76 year old year old female with HTN, bronchitis hx, with recent PNA RUL discharged 2 days ago presenting to ED due to SOB noted, pt states worsened dyspnea on exertion - otherwise feeling worse at home past day.

## 2017-02-12 NOTE — ED PROVIDER NOTE - MEDICAL DECISION MAKING DETAILS
PNA noted to admit for further care of likely Staph infection that is worsening on XR- Dr. Zepeda aware - to admit under Dr. Mackay

## 2017-02-12 NOTE — H&P ADULT. - ATTENDING COMMENTS
ED MD contacted me at 11:35 on 2/12/17.    Reviewed  CT CHEST  results of   02/06/2017    Radiologist stated: "INTERPRETATION:  Clinical information: Right upper lobe infiltrate,   question cavitationComparison: None  CT of the Chest was performed without intravenous contrast.  FINDINGS:  CHEST:CHEST WALL AND LOWER NECK: Within normal limits  MEDIASTINUM AND NIYAH: 1 cm mediastinal lymph nodes, nonspecific and may  be reactive.  HEART: Extensive coronary calcifications.  VESSELS: Unopacified great vessels are unremarkable.  LUNG AND LARGE AIRWAYS: Extensive emphysema. Patchy airspace opacities in   the right upper lobe. 1 cm nodule anterior segment right upper lobe image   49, probably infectious but should be followed. Scattered tree-in-bud   opacities in the right lower lobe. No pleural effusion. 7 mm nodule left   lower lobe with probable fat, possibly hamartoma but should be followed.   4 mm nodule left upper lobe, indeterminate.  UPPER ABDOMEN: Within normal limits  MUSCULOSKELETAL: Within normal limits  IMPRESSION: Right upper lobe pneumonia. Extensive emphysema. Incidental subcentimeter   lung nodules, as described, 3-6 months follow-up with a low dose CT is   recommended."

## 2017-02-12 NOTE — H&P ADULT. - CONSTITUTIONAL DETAILS
well-developed/well-groomed/respiratory distress well-groomed/at time of my exam, able to converse/well-developed/no distress

## 2017-02-12 NOTE — CONSULT NOTE ADULT - SUBJECTIVE AND OBJECTIVE BOX
Patient is a 76y old  Female who presents with a chief complaint of SOB (12 Feb 2017 12:11)      HPI:  75yo woman with recent PNA RUL, COPD/Emphysema ex tobacco use, HTN, HLD,   discharged 2 days ago after being admitted for RUL pneumonia and COPD exacerbation. Again presented to ED due to  worsened dyspnea on exertion - otherwise feeling worse at home past day.	  Admitted from 2/6/17 through 2/10/17, for three day history of SOB and ARAGON Provider at that time stated: "Patient denied sick contacts or recent travel. has two pets at home (dog and bird). Patient denied fever but has increased cough productive of green sputum.  Dx was right upper lobe pneumonia treated with a course of Levaquin and steroid dose for her emphysema. Patient did have an oxygen requirement while in the hospital but declined home 02."  Now Admitted with Worsening RUL infiltrate and hypoxia.  Smoked for 40 years, quit 15 years ago.    PAST MEDICAL & SURGICAL HISTORY:  COPD (chronic obstructive pulmonary disease)  Hypercholesteremia  Hypertension  No significant past surgical history    FAMILY HISTORY:  No pertinent family history in first degree relatives    SOCIAL HISTORY: BMI (kg/m2): 23.3 . Smoked for 40 years , quit 15 years ago.    Allergies  penicillin (Hives; Rash)    MEDICATIONS  (STANDING):  aztreonam  IVPB 1000milliGRAM(s) IV Intermittent once  aspirin  chewable 81milliGRAM(s) Oral daily  atorvastatin 10milliGRAM(s) Oral at bedtime  benzonatate 100milliGRAM(s) Oral three times a day  enoxaparin Injectable 40milliGRAM(s) SubCutaneous daily  ALBUTerol/ipratropium for Nebulization 3milliLiter(s) Nebulizer every 6 hours  ALBUTerol    90 MICROgram(s) HFA Inhaler 1Puff(s) Inhalation every 4 hours  tiotropium 18 MICROgram(s) Capsule 1Capsule(s) Inhalation daily    MEDICATIONS  (PRN):  acetaminophen   Tablet. 650milliGRAM(s) Oral every 6 hours PRN For Temp greater than 38 C (100.4 F)  acetaminophen   Tablet. 650milliGRAM(s) Oral every 6 hours PRN Mild Pain (1 - 3)  guaiFENesin   Syrup  (Sugar-Free) 200milliGRAM(s) Oral every 6 hours PRN Cough    REVIEW OF SYSTEMS:    Constitutional:            No fever, feeling weak.  HEENT:                         No difficulty hearing, tinnitus, vertigo; No sinus or throat pain  Respiratory:              sob, productive cough.  Cardiovascular:           No chest pain, palpitations  Gastrointestinal:        No abdominal or epigastric pain. No N/V/diarrhea or hematemesis  Genitourinary:            No dysuria, frequency, hematuria or incontinence  SKIN:                             no rash  Musculoskeletal:        No joint pain or swelling  Extremities:                No swelling  Neurological:              No headaches  Psychiatric:                 No depression, anxiety    Vital Signs Last 24 Hrs  T(C): 37.1, Max: 37.1 (02-12 @ 10:07)  T(F): 98.8, Max: 98.8 (02-12 @ 10:07)  HR: 92 (92 - 107)  BP: 139/69 (139/69 - 154/75)  BP(mean): --  RR: 16 (16 - 20)  SpO2: 90% (86% - 94%)    PHYSICAL EXAM:  GEN:         Awake, responsive and comfortable.  HEENT:    Normal.    RESP:      decreased air entry.  CVS:          Regular rate and rhythm.   ABD:         Soft, non-tender, non-distended;   :             No costovertebral angle tenderness  SKIN:           Warm and dry.  EXTR:            No clubbing, cyanosis or edema  CNS:              Intact sensory and motor function.  PSYCH:        cooperative, no anxiety or depression    LABS:                        12.2   40.7  )-----------( 405      ( 12 Feb 2017 10:55 )             35.6     12 Feb 2017 10:55    139    |  100    |  23     ----------------------------<  131    3.7     |  30     |  0.75     Ca    8.8        12 Feb 2017 10:55    TPro  6.4    /  Alb  1.9    /  TBili  0.4    /  DBili  x      /  AST  45     /  ALT  86     /  AlkPhos  112    12 Feb 2017 10:55    PT/INR - ( 12 Feb 2017 10:55 )   PT: 15.4 sec;   INR: 1.37 ratio         PTT - ( 12 Feb 2017 10:55 )  PTT:27.9 sec  ABG - 02-12 @ 13:23  pH: 7.43 / pO2: 46 / HCO3: 30 / Base Excess: 5.3 / SaO2: 97    EKG: sinus rhythm.    RADIOLOGY & ADDITIONAL STUDIES:    EXAM:  CHEST SINGLE VIEW                            PROCEDURE DATE:  02/12/2017        INTERPRETATION:  PROCEDURE: AP view of the chest.    CLINICAL INFORMATION: Shortness of breath    COMPARISON: 2/6/2017    FINDINGS:        There is interval worsening of right upper lung consolidation. The left   lung is clear. The cardiac and mediastinal contours are prominent, which   may be due to magnification from AP technique and shallow inspiration.   The osseous structures are intact.    IMPRESSION:    Interval worsening of right upper lung pneumonia.    ADA CRANE M.D., ATTENDING RADIOLOGIST  This document has been electronically signed. Feb 12 2017 11:01AM      ASSESSMENT AND PLAN:  ·	SOB.  ·	Hypoxia.  ·	RUL pneumonia.  ·	Leukocytosis.  ·	COPD exacerbation.  ·	HTN  ·	HLD    Supplemental O2 with broader antibiotic coverage.  DC steroids due to leukocytosis.  Sputum Culture.  Room Air ABG.

## 2017-02-12 NOTE — H&P ADULT. - HISTORY OF PRESENT ILLNESS
77yo woman with recent PNA RUL, COPD/Emphysema ex tobacco use   discharged 2 days ago presenting to ED due to SOB noted, pt states worsened dyspnea on exertion - otherwise feeling worse at home past day.	  Admitted from 2/6/17 through 2/10/17, for three day history of SOB and ARAGON Provider at that time stated: "Patient denied sick contacts or recent travel. has two pets at home (dog and bird). Patient denied fever but has increased cough productive of green sputum.  Dx was right upper lobe pneumonia treated with a course of Levaquin and steroid dose for her emphysema. patient did have an oxygen requirement while in the hospital but declined home 02."     AP chest film. "Comparison is made to 11/10/2015  FINDINGS: There are patchy opacities in the right upper lobe.  Heart size   is within normal limits. The osseous structures are intact.  IMPRESSION: New right upper lobe patchy opacities compatible with   pneumonia."    See CT results  below. 75yo woman with recent PNA RUL, COPD/Emphysema ex tobacco use   discharged 2 days ago presenting to ED due to SOB noted, pt states worsened dyspnea on exertion - otherwise feeling worse at home past day.	  Admitted from 2/6/17 through 2/10/17, for three day history of SOB and ARAGON Provider at that time stated: "Patient denied sick contacts or recent travel. has two pets at home (dog and bird). Patient denied fever but has increased cough productive of green sputum.  Dx was right upper lobe pneumonia treated with a course of Levaquin and steroid dose for her emphysema. patient did have an oxygen requirement while in the hospital but declined home 02."    At time of my exam in ED,  pt was feeling better, speaking in full paragraphs.     AP chest film. "Comparison is made to 11/10/2015  FINDINGS: There are patchy opacities in the right upper lobe.  Heart size   is within normal limits. The osseous structures are intact.  IMPRESSION: New right upper lobe patchy opacities compatible with   pneumonia."    See CT results  below.

## 2017-02-13 LAB
ANION GAP SERPL CALC-SCNC: 4 MMOL/L — LOW (ref 5–17)
BUN SERPL-MCNC: 29 MG/DL — HIGH (ref 7–23)
CALCIUM SERPL-MCNC: 8.8 MG/DL — SIGNIFICANT CHANGE UP (ref 8.5–10.1)
CHLORIDE SERPL-SCNC: 102 MMOL/L — SIGNIFICANT CHANGE UP (ref 96–108)
CO2 SERPL-SCNC: 35 MMOL/L — HIGH (ref 22–31)
CREAT SERPL-MCNC: 0.85 MG/DL — SIGNIFICANT CHANGE UP (ref 0.5–1.3)
CULTURE RESULTS: SIGNIFICANT CHANGE UP
CULTURE RESULTS: SIGNIFICANT CHANGE UP
GLUCOSE SERPL-MCNC: 254 MG/DL — HIGH (ref 70–99)
GRAM STN FLD: SIGNIFICANT CHANGE UP
HCT VFR BLD CALC: 33.2 % — LOW (ref 34.5–45)
HGB BLD-MCNC: 11.6 G/DL — SIGNIFICANT CHANGE UP (ref 11.5–15.5)
MAGNESIUM SERPL-MCNC: 2.4 MG/DL — SIGNIFICANT CHANGE UP (ref 1.8–2.4)
MCHC RBC-ENTMCNC: 31.2 PG — SIGNIFICANT CHANGE UP (ref 27–34)
MCHC RBC-ENTMCNC: 34.9 GM/DL — SIGNIFICANT CHANGE UP (ref 32–36)
MCV RBC AUTO: 89.6 FL — SIGNIFICANT CHANGE UP (ref 80–100)
PLATELET # BLD AUTO: 407 K/UL — HIGH (ref 150–400)
POTASSIUM SERPL-MCNC: 3.8 MMOL/L — SIGNIFICANT CHANGE UP (ref 3.5–5.3)
POTASSIUM SERPL-SCNC: 3.8 MMOL/L — SIGNIFICANT CHANGE UP (ref 3.5–5.3)
PROCALCITONIN SERPL-MCNC: 0.96 NG/ML — HIGH (ref 0–0.05)
RBC # BLD: 3.7 M/UL — LOW (ref 3.8–5.2)
RBC # FLD: 12.9 % — SIGNIFICANT CHANGE UP (ref 11–15)
SODIUM SERPL-SCNC: 141 MMOL/L — SIGNIFICANT CHANGE UP (ref 135–145)
SPECIMEN SOURCE: SIGNIFICANT CHANGE UP
SPECIMEN SOURCE: SIGNIFICANT CHANGE UP
WBC # BLD: 41.6 K/UL — CRITICAL HIGH (ref 3.8–10.5)
WBC # FLD AUTO: 41.6 K/UL — CRITICAL HIGH (ref 3.8–10.5)

## 2017-02-13 PROCEDURE — 99233 SBSQ HOSP IP/OBS HIGH 50: CPT

## 2017-02-13 PROCEDURE — 99223 1ST HOSP IP/OBS HIGH 75: CPT

## 2017-02-13 RX ORDER — DOCOSANOL 100 MG/G
1 CREAM TOPICAL
Qty: 0 | Refills: 0 | Status: DISCONTINUED | OUTPATIENT
Start: 2017-02-13 | End: 2017-02-19

## 2017-02-13 RX ORDER — AZTREONAM 2 G
2000 VIAL (EA) INJECTION EVERY 8 HOURS
Qty: 0 | Refills: 0 | Status: COMPLETED | OUTPATIENT
Start: 2017-02-13 | End: 2017-02-18

## 2017-02-13 RX ADMIN — DOCOSANOL 1 APPLICATION(S): 100 CREAM TOPICAL at 16:21

## 2017-02-13 RX ADMIN — Medication 100 MILLIGRAM(S): at 05:29

## 2017-02-13 RX ADMIN — Medication 250 MILLIGRAM(S): at 12:23

## 2017-02-13 RX ADMIN — Medication 100 MILLIGRAM(S): at 13:39

## 2017-02-13 RX ADMIN — Medication 100 MILLIGRAM(S): at 23:10

## 2017-02-13 RX ADMIN — ATORVASTATIN CALCIUM 10 MILLIGRAM(S): 80 TABLET, FILM COATED ORAL at 23:10

## 2017-02-13 RX ADMIN — Medication 200 MILLIGRAM(S): at 12:24

## 2017-02-13 RX ADMIN — Medication 650 MILLIGRAM(S): at 09:15

## 2017-02-13 RX ADMIN — Medication 81 MILLIGRAM(S): at 12:24

## 2017-02-13 RX ADMIN — Medication 3 MILLILITER(S): at 23:01

## 2017-02-13 RX ADMIN — Medication 3 MILLILITER(S): at 11:47

## 2017-02-13 RX ADMIN — Medication 3 MILLILITER(S): at 17:31

## 2017-02-13 RX ADMIN — Medication 50 MILLIGRAM(S): at 05:29

## 2017-02-13 RX ADMIN — Medication 3 MILLILITER(S): at 06:16

## 2017-02-13 RX ADMIN — ENOXAPARIN SODIUM 40 MILLIGRAM(S): 100 INJECTION SUBCUTANEOUS at 12:23

## 2017-02-13 RX ADMIN — Medication 100 MILLIGRAM(S): at 23:11

## 2017-02-13 RX ADMIN — Medication 650 MILLIGRAM(S): at 08:26

## 2017-02-13 RX ADMIN — DOCOSANOL 1 APPLICATION(S): 100 CREAM TOPICAL at 20:07

## 2017-02-13 RX ADMIN — Medication 50 MILLIGRAM(S): at 13:38

## 2017-02-13 NOTE — CONSULT NOTE ADULT - PROBLEM SELECTOR RECOMMENDATION 9
significantly worse pneumonia on CXR  cont azactam and vanco   F/U on PCT  F/U on sputum c/s .blood cultures etc  Leg/strep/ymycoplasma antigen etc

## 2017-02-13 NOTE — CONSULT NOTE ADULT - SUBJECTIVE AND OBJECTIVE BOX
Infectious Diseases - Attending at Dr. Arias    HPI:  75yo woman with recent PNA RUL, COPD/Emphysema ex tobacco use   discharged 2 days ago presenting to ED due to SOB noted, pt states worsened dyspnea on exertion - otherwise feeling worse at home past day.	  Admitted from 2/6/17 through 2/10/17, for three day history of SOB and ARAGON Provider at that time stated: "Patient denied sick contacts or recent travel. has two pets at home (dog and bird). Patient denied fever but has increased cough productive of green sputum.  Dx was right upper lobe pneumonia treated with a course of Levaquin and steroid dose for her emphysema. patient did have an oxygen requirement while in the hospital but declined home 02."    When I saw her today she was still SOB though better than earlier complains of tight chest pian with cough ,phlegm present.No diarrhea,no fever,c/o chills and sweating.CXR shows right lung  PNA. Started on vanco and azactam sec to PCN allergy     PAST MEDICAL & SURGICAL HISTORY:  COPD (chronic obstructive pulmonary disease)  Hypercholesteremia  Hypertension  No significant past surgical history      Allergies    penicillin (Hives; Rash)    Intolerances        FAMILY HISTORY:  No pertinent family history in first degree relatives      SOCIAL HISTORY:smoker    REVIEW OF SYSTEMS:    Constitutional: No fever, weight loss or fatigue+sweating and chills  Eyes: No eye pain, visual disturbances, or discharge  ENT:  No difficulty hearing, tinnitus, vertigo; No sinus or throat pain  Neck: No pain or stiffness  Breasts: No pain, masses or nipple discharge  Respiratory: + cough,+ wheezing, no  hemoptysis  Cardiovascular: right chest pain with cough,no palpitations, +shortness of breath, dizziness or leg swelling  Gastrointestinal: No abdominal or epigastric pain. No nausea, vomiting or hematemesis; No diarrhea or constipation. No melena or hematochezia.  Genitourinary: No dysuria, frequency, hematuria or incontinence  Rectal: No pain, hemorrhoids or incontinence  Neurological: No headaches, memory loss, loss of strength, numbness or tremors  Skin: No itching, burning, rashes or lesions   Lymph Nodes: No enlarged glands  Endocrine: No heat or cold intolerance; No hair loss  Musculoskeletal: No joint pain or swelling; No muscle, back or extremity pain  Psychiatric: No depression, anxiety, mood swings or difficulty sleeping  Heme/Lymph: No easy bruising or bleeding gums  Allergy and Immunologic: No hives or eczema    MEDICATIONS  (STANDING):  aspirin  chewable 81milliGRAM(s) Oral daily  atorvastatin 10milliGRAM(s) Oral at bedtime  benzonatate 100milliGRAM(s) Oral three times a day  enoxaparin Injectable 40milliGRAM(s) SubCutaneous daily  ALBUTerol/ipratropium for Nebulization 3milliLiter(s) Nebulizer every 6 hours  ALBUTerol    90 MICROgram(s) HFA Inhaler 1Puff(s) Inhalation every 4 hours  tiotropium 18 MICROgram(s) Capsule 1Capsule(s) Inhalation daily  vancomycin  IVPB 1000milliGRAM(s) IV Intermittent every 12 hours  aztreonam  IVPB 2000milliGRAM(s) IV Intermittent every 8 hours  docosanol 10% Cream 1Application(s) Topical five times a day    MEDICATIONS  (PRN):  acetaminophen   Tablet. 650milliGRAM(s) Oral every 6 hours PRN For Temp greater than 38 C (100.4 F)  acetaminophen   Tablet. 650milliGRAM(s) Oral every 6 hours PRN Mild Pain (1 - 3)  guaiFENesin   Syrup  (Sugar-Free) 200milliGRAM(s) Oral every 6 hours PRN Cough      Vital Signs Last 24 Hrs  T(C): 36.6, Max: 36.6 (02-13 @ 17:24)  T(F): 97.8, Max: 97.8 (02-13 @ 17:24)  HR: 94 (76 - 104)  BP: 129/67 (119/66 - 129/67)  BP(mean): --  RR: 16 (16 - 17)  SpO2: 94% (93% - 96%)    PHYSICAL EXAM:    Constitutional: NAD, well-groomed, well-developed  HEENT: PERRLA, EOMI, Normal Hearing, MMM  Neck: No LAD, No JVD  Back: Normal spine flexure, No CVA tenderness  Respiratory: CTAB/L  Cardiovascular: S1 and S2, RRR, no M/G/R  Gastrointestinal: BS+, soft, NT/ND  Extremities: No peripheral edema  Vascular: 2+ peripheral pulses  Neurological: A/O x 3, no focal deficits  Skin: No rashes      LABS:                        11.6   41.6  )-----------( 407      ( 13 Feb 2017 10:29 )             33.2     13 Feb 2017 10:29    141    |  102    |  29     ----------------------------<  254    3.8     |  35     |  0.85     Ca    8.8        13 Feb 2017 10:29  Mg     2.4       13 Feb 2017 10:29    TPro  6.4    /  Alb  1.9    /  TBili  0.4    /  DBili  x      /  AST  45     /  ALT  86     /  AlkPhos  112    12 Feb 2017 10:55    PT/INR - ( 12 Feb 2017 10:55 )   PT: 15.4 sec;   INR: 1.37 ratio         PTT - ( 12 Feb 2017 10:55 )  PTT:27.9 sec      MICROBIOLOGY:  RECENT CULTURES:  02-12 .Blood Blood XXXX XXXX   No growth to date.    02-08 .Urine Clean Catch (Midstream) XXXX XXXX   <10,000 CFU/ml  Normal Urogenital alberto present    02-08 .Blood Blood-Peripheral XXXX XXXX   No growth at 5 days.    02-07 .Sputum Sputum conical XXXX   Few WBC's  Few squamous epithelial cells  Rare Gram Positive Cocci in Clusters  Rare Yeast like cells   Rare Aspergillus species  Normal Respiratory Alberto present    02-07 .Blood Blood-Peripheral XXXX XXXX   No growth at 5 days.          RADIOLOGY & ADDITIONAL STUDIES:    CXR  FINDINGS:        There is interval worsening of right upper lung consolidation. The left   lung is clear. The cardiac and mediastinal contours are prominent, which   may be due to magnification from AP technique and shallow inspiration.   The osseous structures are intact.    IMPRESSION:    Interval worsening of right upper lung pneumonia.

## 2017-02-13 NOTE — PROGRESS NOTE ADULT - ASSESSMENT
75 y/o female readmission for right upper lobe pneumonia in setting of COPD emphysema.  Leukocytosis

## 2017-02-14 DIAGNOSIS — Z79.51 LONG TERM (CURRENT) USE OF INHALED STEROIDS: ICD-10-CM

## 2017-02-14 DIAGNOSIS — B00.1 HERPESVIRAL VESICULAR DERMATITIS: ICD-10-CM

## 2017-02-14 DIAGNOSIS — I10 ESSENTIAL (PRIMARY) HYPERTENSION: ICD-10-CM

## 2017-02-14 DIAGNOSIS — R91.8 OTHER NONSPECIFIC ABNORMAL FINDING OF LUNG FIELD: ICD-10-CM

## 2017-02-14 DIAGNOSIS — J44.1 CHRONIC OBSTRUCTIVE PULMONARY DISEASE WITH (ACUTE) EXACERBATION: ICD-10-CM

## 2017-02-14 DIAGNOSIS — A41.50 GRAM-NEGATIVE SEPSIS, UNSPECIFIED: ICD-10-CM

## 2017-02-14 DIAGNOSIS — J15.9 UNSPECIFIED BACTERIAL PNEUMONIA: ICD-10-CM

## 2017-02-14 DIAGNOSIS — E78.5 HYPERLIPIDEMIA, UNSPECIFIED: ICD-10-CM

## 2017-02-14 DIAGNOSIS — Z87.891 PERSONAL HISTORY OF NICOTINE DEPENDENCE: ICD-10-CM

## 2017-02-14 DIAGNOSIS — J15.6 PNEUMONIA DUE TO OTHER GRAM-NEGATIVE BACTERIA: ICD-10-CM

## 2017-02-14 DIAGNOSIS — Z79.52 LONG TERM (CURRENT) USE OF SYSTEMIC STEROIDS: ICD-10-CM

## 2017-02-14 DIAGNOSIS — Z79.82 LONG TERM (CURRENT) USE OF ASPIRIN: ICD-10-CM

## 2017-02-14 DIAGNOSIS — Z88.0 ALLERGY STATUS TO PENICILLIN: ICD-10-CM

## 2017-02-14 DIAGNOSIS — J18.9 PNEUMONIA, UNSPECIFIED ORGANISM: ICD-10-CM

## 2017-02-14 LAB
ALBUMIN SERPL ELPH-MCNC: 1.6 G/DL — LOW (ref 3.3–5)
ALP SERPL-CCNC: 130 U/L — HIGH (ref 40–120)
ALT FLD-CCNC: 89 U/L — HIGH (ref 12–78)
ANION GAP SERPL CALC-SCNC: 8 MMOL/L — SIGNIFICANT CHANGE UP (ref 5–17)
AST SERPL-CCNC: 49 U/L — HIGH (ref 15–37)
BASE EXCESS BLDA CALC-SCNC: 8.8 MMOL/L — HIGH (ref -2–2)
BILIRUB SERPL-MCNC: 0.3 MG/DL — SIGNIFICANT CHANGE UP (ref 0.2–1.2)
BLOOD GAS COMMENTS: SIGNIFICANT CHANGE UP
BLOOD GAS COMMENTS: SIGNIFICANT CHANGE UP
BLOOD GAS SOURCE: SIGNIFICANT CHANGE UP
BUN SERPL-MCNC: 28 MG/DL — HIGH (ref 7–23)
CALCIUM SERPL-MCNC: 8.7 MG/DL — SIGNIFICANT CHANGE UP (ref 8.5–10.1)
CHLORIDE SERPL-SCNC: 102 MMOL/L — SIGNIFICANT CHANGE UP (ref 96–108)
CO2 SERPL-SCNC: 31 MMOL/L — SIGNIFICANT CHANGE UP (ref 22–31)
CREAT SERPL-MCNC: 0.79 MG/DL — SIGNIFICANT CHANGE UP (ref 0.5–1.3)
GLUCOSE SERPL-MCNC: 101 MG/DL — HIGH (ref 70–99)
HCO3 BLDA-SCNC: 33 MMOL/L — HIGH (ref 21–29)
HCT VFR BLD CALC: 33.6 % — LOW (ref 34.5–45)
HGB BLD-MCNC: 11.4 G/DL — LOW (ref 11.5–15.5)
HOROWITZ INDEX BLDA+IHG-RTO: 28 — SIGNIFICANT CHANGE UP
LACTATE SERPL-SCNC: 1.2 MMOL/L — SIGNIFICANT CHANGE UP (ref 0.7–2)
MCHC RBC-ENTMCNC: 31.3 PG — SIGNIFICANT CHANGE UP (ref 27–34)
MCHC RBC-ENTMCNC: 34 GM/DL — SIGNIFICANT CHANGE UP (ref 32–36)
MCV RBC AUTO: 92 FL — SIGNIFICANT CHANGE UP (ref 80–100)
PCO2 BLDA: 42 MMHG — SIGNIFICANT CHANGE UP (ref 32–46)
PH BLD: 7.5 — HIGH (ref 7.35–7.45)
PLATELET # BLD AUTO: 391 K/UL — SIGNIFICANT CHANGE UP (ref 150–400)
PO2 BLDA: 61 MMHG — LOW (ref 74–108)
POTASSIUM SERPL-MCNC: 3.6 MMOL/L — SIGNIFICANT CHANGE UP (ref 3.5–5.3)
POTASSIUM SERPL-SCNC: 3.6 MMOL/L — SIGNIFICANT CHANGE UP (ref 3.5–5.3)
PROT SERPL-MCNC: 6.5 GM/DL — SIGNIFICANT CHANGE UP (ref 6–8.3)
RBC # BLD: 3.65 M/UL — LOW (ref 3.8–5.2)
RBC # FLD: 13.4 % — SIGNIFICANT CHANGE UP (ref 11–15)
SAO2 % BLDA: 92 % — SIGNIFICANT CHANGE UP (ref 92–96)
SODIUM SERPL-SCNC: 141 MMOL/L — SIGNIFICANT CHANGE UP (ref 135–145)
VANCOMYCIN TROUGH SERPL-MCNC: 15.2 UG/ML — SIGNIFICANT CHANGE UP (ref 10–20)
WBC # BLD: 34.2 K/UL — HIGH (ref 3.8–10.5)
WBC # FLD AUTO: 34.2 K/UL — HIGH (ref 3.8–10.5)

## 2017-02-14 PROCEDURE — 99233 SBSQ HOSP IP/OBS HIGH 50: CPT

## 2017-02-14 RX ORDER — MONTELUKAST 4 MG/1
10 TABLET, CHEWABLE ORAL DAILY
Qty: 0 | Refills: 0 | Status: DISCONTINUED | OUTPATIENT
Start: 2017-02-14 | End: 2017-02-19

## 2017-02-14 RX ADMIN — DOCOSANOL 1 APPLICATION(S): 100 CREAM TOPICAL at 17:42

## 2017-02-14 RX ADMIN — Medication 3 MILLILITER(S): at 05:36

## 2017-02-14 RX ADMIN — Medication 650 MILLIGRAM(S): at 07:39

## 2017-02-14 RX ADMIN — ATORVASTATIN CALCIUM 10 MILLIGRAM(S): 80 TABLET, FILM COATED ORAL at 22:03

## 2017-02-14 RX ADMIN — Medication 100 MILLIGRAM(S): at 06:16

## 2017-02-14 RX ADMIN — Medication 3 MILLILITER(S): at 17:13

## 2017-02-14 RX ADMIN — Medication 100 MILLIGRAM(S): at 13:40

## 2017-02-14 RX ADMIN — ENOXAPARIN SODIUM 40 MILLIGRAM(S): 100 INJECTION SUBCUTANEOUS at 11:12

## 2017-02-14 RX ADMIN — Medication 3 MILLILITER(S): at 23:43

## 2017-02-14 RX ADMIN — Medication 40 MILLIGRAM(S): at 22:03

## 2017-02-14 RX ADMIN — Medication 650 MILLIGRAM(S): at 23:45

## 2017-02-14 RX ADMIN — DOCOSANOL 1 APPLICATION(S): 100 CREAM TOPICAL at 00:07

## 2017-02-14 RX ADMIN — DOCOSANOL 1 APPLICATION(S): 100 CREAM TOPICAL at 11:12

## 2017-02-14 RX ADMIN — Medication 81 MILLIGRAM(S): at 11:12

## 2017-02-14 RX ADMIN — Medication 3 MILLILITER(S): at 11:41

## 2017-02-14 RX ADMIN — Medication 250 MILLIGRAM(S): at 23:30

## 2017-02-14 RX ADMIN — DOCOSANOL 1 APPLICATION(S): 100 CREAM TOPICAL at 07:39

## 2017-02-14 RX ADMIN — Medication 250 MILLIGRAM(S): at 00:07

## 2017-02-14 RX ADMIN — DOCOSANOL 1 APPLICATION(S): 100 CREAM TOPICAL at 23:46

## 2017-02-14 RX ADMIN — Medication 650 MILLIGRAM(S): at 08:09

## 2017-02-14 RX ADMIN — Medication 250 MILLIGRAM(S): at 11:12

## 2017-02-14 RX ADMIN — DOCOSANOL 1 APPLICATION(S): 100 CREAM TOPICAL at 21:00

## 2017-02-14 RX ADMIN — Medication 100 MILLIGRAM(S): at 22:03

## 2017-02-14 RX ADMIN — Medication 100 MILLIGRAM(S): at 22:04

## 2017-02-14 NOTE — PROGRESS NOTE ADULT - ASSESSMENT
75 y/o female with community aquired pneumonia symptomatically worse after recent discharge requires hospital admission

## 2017-02-15 DIAGNOSIS — A40.3 SEPSIS DUE TO STREPTOCOCCUS PNEUMONIAE: ICD-10-CM

## 2017-02-15 LAB
ALBUMIN SERPL ELPH-MCNC: 1.4 G/DL — LOW (ref 3.3–5)
ALP SERPL-CCNC: 106 U/L — SIGNIFICANT CHANGE UP (ref 40–120)
ALT FLD-CCNC: 66 U/L — SIGNIFICANT CHANGE UP (ref 12–78)
ANION GAP SERPL CALC-SCNC: 5 MMOL/L — SIGNIFICANT CHANGE UP (ref 5–17)
AST SERPL-CCNC: 29 U/L — SIGNIFICANT CHANGE UP (ref 15–37)
BILIRUB SERPL-MCNC: 0.3 MG/DL — SIGNIFICANT CHANGE UP (ref 0.2–1.2)
BUN SERPL-MCNC: 24 MG/DL — HIGH (ref 7–23)
CALCIUM SERPL-MCNC: 8.2 MG/DL — LOW (ref 8.5–10.1)
CHLORIDE SERPL-SCNC: 100 MMOL/L — SIGNIFICANT CHANGE UP (ref 96–108)
CO2 SERPL-SCNC: 34 MMOL/L — HIGH (ref 22–31)
CREAT SERPL-MCNC: 0.76 MG/DL — SIGNIFICANT CHANGE UP (ref 0.5–1.3)
GLUCOSE SERPL-MCNC: 210 MG/DL — HIGH (ref 70–99)
HCT VFR BLD CALC: 32.1 % — LOW (ref 34.5–45)
HGB BLD-MCNC: 11.2 G/DL — LOW (ref 11.5–15.5)
MCHC RBC-ENTMCNC: 31.5 PG — SIGNIFICANT CHANGE UP (ref 27–34)
MCHC RBC-ENTMCNC: 34.9 GM/DL — SIGNIFICANT CHANGE UP (ref 32–36)
MCV RBC AUTO: 90.1 FL — SIGNIFICANT CHANGE UP (ref 80–100)
PLATELET # BLD AUTO: 366 K/UL — SIGNIFICANT CHANGE UP (ref 150–400)
POTASSIUM SERPL-MCNC: 3.6 MMOL/L — SIGNIFICANT CHANGE UP (ref 3.5–5.3)
POTASSIUM SERPL-SCNC: 3.6 MMOL/L — SIGNIFICANT CHANGE UP (ref 3.5–5.3)
PROT SERPL-MCNC: 6.1 GM/DL — SIGNIFICANT CHANGE UP (ref 6–8.3)
RBC # BLD: 3.57 M/UL — LOW (ref 3.8–5.2)
RBC # FLD: 13.2 % — SIGNIFICANT CHANGE UP (ref 11–15)
SODIUM SERPL-SCNC: 139 MMOL/L — SIGNIFICANT CHANGE UP (ref 135–145)
WBC # BLD: 27.2 K/UL — HIGH (ref 3.8–10.5)
WBC # FLD AUTO: 27.2 K/UL — HIGH (ref 3.8–10.5)

## 2017-02-15 PROCEDURE — 99233 SBSQ HOSP IP/OBS HIGH 50: CPT

## 2017-02-15 RX ORDER — BENZOCAINE AND MENTHOL 5; 1 G/100ML; G/100ML
1 LIQUID ORAL
Qty: 0 | Refills: 0 | Status: DISCONTINUED | OUTPATIENT
Start: 2017-02-15 | End: 2017-02-19

## 2017-02-15 RX ORDER — IBUPROFEN 200 MG
600 TABLET ORAL EVERY 6 HOURS
Qty: 0 | Refills: 0 | Status: DISCONTINUED | OUTPATIENT
Start: 2017-02-15 | End: 2017-02-19

## 2017-02-15 RX ADMIN — Medication 250 MILLIGRAM(S): at 14:12

## 2017-02-15 RX ADMIN — Medication 100 MILLIGRAM(S): at 22:40

## 2017-02-15 RX ADMIN — DOCOSANOL 1 APPLICATION(S): 100 CREAM TOPICAL at 20:40

## 2017-02-15 RX ADMIN — Medication 100 MILLIGRAM(S): at 05:24

## 2017-02-15 RX ADMIN — DOCOSANOL 1 APPLICATION(S): 100 CREAM TOPICAL at 09:13

## 2017-02-15 RX ADMIN — Medication 100 MILLIGRAM(S): at 14:11

## 2017-02-15 RX ADMIN — DOCOSANOL 1 APPLICATION(S): 100 CREAM TOPICAL at 23:40

## 2017-02-15 RX ADMIN — Medication 3 MILLILITER(S): at 18:16

## 2017-02-15 RX ADMIN — MONTELUKAST 10 MILLIGRAM(S): 4 TABLET, CHEWABLE ORAL at 14:13

## 2017-02-15 RX ADMIN — ENOXAPARIN SODIUM 40 MILLIGRAM(S): 100 INJECTION SUBCUTANEOUS at 14:13

## 2017-02-15 RX ADMIN — DOCOSANOL 1 APPLICATION(S): 100 CREAM TOPICAL at 14:12

## 2017-02-15 RX ADMIN — Medication 40 MILLIGRAM(S): at 14:14

## 2017-02-15 RX ADMIN — ATORVASTATIN CALCIUM 10 MILLIGRAM(S): 80 TABLET, FILM COATED ORAL at 22:40

## 2017-02-15 RX ADMIN — Medication 81 MILLIGRAM(S): at 14:12

## 2017-02-15 RX ADMIN — DOCOSANOL 1 APPLICATION(S): 100 CREAM TOPICAL at 16:43

## 2017-02-15 RX ADMIN — Medication 200 MILLIGRAM(S): at 14:26

## 2017-02-15 RX ADMIN — Medication 100 MILLIGRAM(S): at 05:23

## 2017-02-15 RX ADMIN — Medication 100 MILLIGRAM(S): at 14:13

## 2017-02-15 RX ADMIN — Medication 3 MILLILITER(S): at 05:09

## 2017-02-15 RX ADMIN — Medication 40 MILLIGRAM(S): at 05:23

## 2017-02-15 RX ADMIN — Medication 3 MILLILITER(S): at 11:25

## 2017-02-15 RX ADMIN — Medication 250 MILLIGRAM(S): at 23:40

## 2017-02-15 RX ADMIN — Medication 40 MILLIGRAM(S): at 22:40

## 2017-02-15 NOTE — CHART NOTE - NSCHARTNOTEFT_GEN_A_CORE
Called by Rn to evaluate patient for swollen right lower leg. Patient seen and examined at bedside. Patient stated that she has no pain in the leg however she has tendency to lay with that leg crossed or dangling of the bed. Patient currently ambulatory and on Called by Rn to evaluate patient for swollen right lower leg. Patient seen and examined at bedside. Patient stated that she has no pain in the leg however she has tendency to lay with that leg crossed or dangling of the bed. Patient currently ambulatory and on lovenox and aspirin.  /65, 85 Called by Rn to evaluate patient for swollen right lower leg. Patient seen and examined at bedside. Patient stated that she has no pain in the leg however she has tendency to lay with that leg crossed or dangling of the bed. Patient currently ambulatory and on lovenox and aspirin.  /65, 85, 16 , 95% 2 L NC  Gen Patient is alert, awake and oriented x 3  Extremities rt lower leg calf is mildly swollen, , negative Sahy's sign, DP and TP plus 2 , sensory intact, Motor strength 5/5.  A/P Patient admitted for PNA, Htn, CHOL, and COPD.  Will elevate lower leg,  obtain sonogram in Am of lower leg to r/o dvt  will continue to monitor  [facility]

## 2017-02-16 DIAGNOSIS — R78.81 BACTEREMIA: ICD-10-CM

## 2017-02-16 DIAGNOSIS — A49.9 BACTERIAL INFECTION, UNSPECIFIED: ICD-10-CM

## 2017-02-16 LAB
-  AMPICILLIN/SULBACTAM: SIGNIFICANT CHANGE UP
-  CEFAZOLIN: SIGNIFICANT CHANGE UP
-  CIPROFLOXACIN: SIGNIFICANT CHANGE UP
-  CLINDAMYCIN: SIGNIFICANT CHANGE UP
-  ERYTHROMYCIN: SIGNIFICANT CHANGE UP
-  GENTAMICIN: SIGNIFICANT CHANGE UP
-  LEVOFLOXACIN: SIGNIFICANT CHANGE UP
-  MOXIFLOXACIN(AEROBIC): SIGNIFICANT CHANGE UP
-  OXACILLIN: SIGNIFICANT CHANGE UP
-  RIFAMPIN: SIGNIFICANT CHANGE UP
-  TETRACYCLINE: SIGNIFICANT CHANGE UP
-  TRIMETHOPRIM/SULFAMETHOXAZOLE: SIGNIFICANT CHANGE UP
-  VANCOMYCIN: SIGNIFICANT CHANGE UP
ALBUMIN SERPL ELPH-MCNC: 1.4 G/DL — LOW (ref 3.3–5)
ALP SERPL-CCNC: 93 U/L — SIGNIFICANT CHANGE UP (ref 40–120)
ALT FLD-CCNC: 63 U/L — SIGNIFICANT CHANGE UP (ref 12–78)
ANION GAP SERPL CALC-SCNC: 5 MMOL/L — SIGNIFICANT CHANGE UP (ref 5–17)
AST SERPL-CCNC: 24 U/L — SIGNIFICANT CHANGE UP (ref 15–37)
BASE EXCESS BLDA CALC-SCNC: 9.3 MMOL/L — HIGH (ref -2–2)
BILIRUB SERPL-MCNC: 0.3 MG/DL — SIGNIFICANT CHANGE UP (ref 0.2–1.2)
BLOOD GAS COMMENTS: SIGNIFICANT CHANGE UP
BLOOD GAS SOURCE: SIGNIFICANT CHANGE UP
BUN SERPL-MCNC: 26 MG/DL — HIGH (ref 7–23)
CALCIUM SERPL-MCNC: 8.3 MG/DL — LOW (ref 8.5–10.1)
CHLORIDE SERPL-SCNC: 100 MMOL/L — SIGNIFICANT CHANGE UP (ref 96–108)
CO2 SERPL-SCNC: 36 MMOL/L — HIGH (ref 22–31)
CREAT SERPL-MCNC: 0.74 MG/DL — SIGNIFICANT CHANGE UP (ref 0.5–1.3)
CULTURE RESULTS: SIGNIFICANT CHANGE UP
GLUCOSE SERPL-MCNC: 250 MG/DL — HIGH (ref 70–99)
GRAM STN FLD: SIGNIFICANT CHANGE UP
HCO3 BLDA-SCNC: 34 MMOL/L — HIGH (ref 21–29)
HCT VFR BLD CALC: 29.5 % — LOW (ref 34.5–45)
HGB BLD-MCNC: 10.4 G/DL — LOW (ref 11.5–15.5)
HOROWITZ INDEX BLDA+IHG-RTO: 30 — SIGNIFICANT CHANGE UP
MCHC RBC-ENTMCNC: 32.7 PG — SIGNIFICANT CHANGE UP (ref 27–34)
MCHC RBC-ENTMCNC: 35.4 GM/DL — SIGNIFICANT CHANGE UP (ref 32–36)
MCV RBC AUTO: 92.4 FL — SIGNIFICANT CHANGE UP (ref 80–100)
METHOD TYPE: SIGNIFICANT CHANGE UP
ORGANISM # SPEC MICROSCOPIC CNT: SIGNIFICANT CHANGE UP
ORGANISM # SPEC MICROSCOPIC CNT: SIGNIFICANT CHANGE UP
PCO2 BLDA: 48 MMHG — HIGH (ref 32–46)
PH BLD: 7.47 — HIGH (ref 7.35–7.45)
PLATELET # BLD AUTO: 342 K/UL — SIGNIFICANT CHANGE UP (ref 150–400)
PO2 BLDA: 67 MMHG — LOW (ref 74–108)
POTASSIUM SERPL-MCNC: 3.7 MMOL/L — SIGNIFICANT CHANGE UP (ref 3.5–5.3)
POTASSIUM SERPL-SCNC: 3.7 MMOL/L — SIGNIFICANT CHANGE UP (ref 3.5–5.3)
PROT SERPL-MCNC: 6 GM/DL — SIGNIFICANT CHANGE UP (ref 6–8.3)
RBC # BLD: 3.19 M/UL — LOW (ref 3.8–5.2)
RBC # FLD: 13.5 % — SIGNIFICANT CHANGE UP (ref 11–15)
S PNEUM AG SER QL: SIGNIFICANT CHANGE UP
SAO2 % BLDA: 94 % — SIGNIFICANT CHANGE UP (ref 92–96)
SODIUM SERPL-SCNC: 141 MMOL/L — SIGNIFICANT CHANGE UP (ref 135–145)
SPECIMEN SOURCE: SIGNIFICANT CHANGE UP
WBC # BLD: 25.4 K/UL — HIGH (ref 3.8–10.5)
WBC # FLD AUTO: 25.4 K/UL — HIGH (ref 3.8–10.5)

## 2017-02-16 PROCEDURE — 99233 SBSQ HOSP IP/OBS HIGH 50: CPT

## 2017-02-16 PROCEDURE — 93971 EXTREMITY STUDY: CPT | Mod: 26,RT

## 2017-02-16 RX ORDER — LACTOBACILLUS ACIDOPHILUS 100MM CELL
1 CAPSULE ORAL
Qty: 0 | Refills: 0 | COMMUNITY

## 2017-02-16 RX ORDER — PREGABALIN 225 MG/1
1 CAPSULE ORAL
Qty: 0 | Refills: 0 | COMMUNITY

## 2017-02-16 RX ORDER — ASCORBIC ACID 60 MG
1 TABLET,CHEWABLE ORAL
Qty: 0 | Refills: 0 | COMMUNITY

## 2017-02-16 RX ADMIN — Medication 81 MILLIGRAM(S): at 12:16

## 2017-02-16 RX ADMIN — Medication 100 MILLIGRAM(S): at 06:17

## 2017-02-16 RX ADMIN — Medication 3 MILLILITER(S): at 23:26

## 2017-02-16 RX ADMIN — Medication 250 MILLIGRAM(S): at 12:17

## 2017-02-16 RX ADMIN — MONTELUKAST 10 MILLIGRAM(S): 4 TABLET, CHEWABLE ORAL at 12:16

## 2017-02-16 RX ADMIN — BENZOCAINE AND MENTHOL 1 LOZENGE: 5; 1 LIQUID ORAL at 12:18

## 2017-02-16 RX ADMIN — Medication 3 MILLILITER(S): at 00:27

## 2017-02-16 RX ADMIN — Medication 200 MILLIGRAM(S): at 18:36

## 2017-02-16 RX ADMIN — Medication 100 MILLIGRAM(S): at 14:47

## 2017-02-16 RX ADMIN — DOCOSANOL 1 APPLICATION(S): 100 CREAM TOPICAL at 12:16

## 2017-02-16 RX ADMIN — DOCOSANOL 1 APPLICATION(S): 100 CREAM TOPICAL at 17:06

## 2017-02-16 RX ADMIN — Medication 100 MILLIGRAM(S): at 22:22

## 2017-02-16 RX ADMIN — Medication 3 MILLILITER(S): at 11:38

## 2017-02-16 RX ADMIN — Medication 650 MILLIGRAM(S): at 07:38

## 2017-02-16 RX ADMIN — Medication 20 MILLIGRAM(S): at 14:46

## 2017-02-16 RX ADMIN — ENOXAPARIN SODIUM 40 MILLIGRAM(S): 100 INJECTION SUBCUTANEOUS at 12:16

## 2017-02-16 RX ADMIN — Medication 3 MILLILITER(S): at 17:29

## 2017-02-16 RX ADMIN — Medication 650 MILLIGRAM(S): at 06:38

## 2017-02-16 RX ADMIN — Medication 3 MILLILITER(S): at 05:22

## 2017-02-16 RX ADMIN — ATORVASTATIN CALCIUM 10 MILLIGRAM(S): 80 TABLET, FILM COATED ORAL at 22:24

## 2017-02-16 RX ADMIN — DOCOSANOL 1 APPLICATION(S): 100 CREAM TOPICAL at 20:00

## 2017-02-16 RX ADMIN — Medication 20 MILLIGRAM(S): at 22:25

## 2017-02-16 RX ADMIN — DOCOSANOL 1 APPLICATION(S): 100 CREAM TOPICAL at 08:42

## 2017-02-16 RX ADMIN — Medication 200 MILLIGRAM(S): at 12:18

## 2017-02-16 RX ADMIN — Medication 40 MILLIGRAM(S): at 06:17

## 2017-02-16 NOTE — PROGRESS NOTE ADULT - PROBLEM SELECTOR PLAN 6
improving  multifactorial
oxygen,nebs
community aquired still with o2 requirement suspect 3 more days of inpatient
likely gram positive
see above

## 2017-02-16 NOTE — PROGRESS NOTE ADULT - ASSESSMENT
75 y/o female with complex pneumonia of the right upper lobe who presented in sepsis slowly improving staph aureus in blood culture may be a containment  Duplex dopler of lower extremity negative for DVT

## 2017-02-16 NOTE — PROGRESS NOTE ADULT - PROBLEM SELECTOR PROBLEM 6
Essential hypertension
Leukocytosis, unspecified type
Panlobular emphysema
Chronic obstructive pulmonary disease with acute lower respiratory infection
Pneumonia of right upper lobe due to other aerobic Gram-negative bacteria
Pneumonia of right upper lobe due to other aerobic Gram-negative bacteria

## 2017-02-17 LAB
ALBUMIN SERPL ELPH-MCNC: 1.5 G/DL — LOW (ref 3.3–5)
ALP SERPL-CCNC: 91 U/L — SIGNIFICANT CHANGE UP (ref 40–120)
ALT FLD-CCNC: 70 U/L — SIGNIFICANT CHANGE UP (ref 12–78)
ANION GAP SERPL CALC-SCNC: 4 MMOL/L — LOW (ref 5–17)
AST SERPL-CCNC: 28 U/L — SIGNIFICANT CHANGE UP (ref 15–37)
BILIRUB SERPL-MCNC: 0.3 MG/DL — SIGNIFICANT CHANGE UP (ref 0.2–1.2)
BUN SERPL-MCNC: 27 MG/DL — HIGH (ref 7–23)
CALCIUM SERPL-MCNC: 8.3 MG/DL — LOW (ref 8.5–10.1)
CHLORIDE SERPL-SCNC: 101 MMOL/L — SIGNIFICANT CHANGE UP (ref 96–108)
CO2 SERPL-SCNC: 35 MMOL/L — HIGH (ref 22–31)
CREAT SERPL-MCNC: 0.79 MG/DL — SIGNIFICANT CHANGE UP (ref 0.5–1.3)
CULTURE RESULTS: SIGNIFICANT CHANGE UP
GLUCOSE SERPL-MCNC: 199 MG/DL — HIGH (ref 70–99)
HCT VFR BLD CALC: 29.2 % — LOW (ref 34.5–45)
HGB BLD-MCNC: 9.9 G/DL — LOW (ref 11.5–15.5)
MCHC RBC-ENTMCNC: 30.7 PG — SIGNIFICANT CHANGE UP (ref 27–34)
MCHC RBC-ENTMCNC: 34 GM/DL — SIGNIFICANT CHANGE UP (ref 32–36)
MCV RBC AUTO: 90.4 FL — SIGNIFICANT CHANGE UP (ref 80–100)
PLATELET # BLD AUTO: 422 K/UL — HIGH (ref 150–400)
POTASSIUM SERPL-MCNC: 4 MMOL/L — SIGNIFICANT CHANGE UP (ref 3.5–5.3)
POTASSIUM SERPL-SCNC: 4 MMOL/L — SIGNIFICANT CHANGE UP (ref 3.5–5.3)
PROT SERPL-MCNC: 5.7 GM/DL — LOW (ref 6–8.3)
RBC # BLD: 3.24 M/UL — LOW (ref 3.8–5.2)
RBC # FLD: 13.2 % — SIGNIFICANT CHANGE UP (ref 11–15)
SODIUM SERPL-SCNC: 140 MMOL/L — SIGNIFICANT CHANGE UP (ref 135–145)
SPECIMEN SOURCE: SIGNIFICANT CHANGE UP
WBC # BLD: 24.7 K/UL — HIGH (ref 3.8–10.5)
WBC # FLD AUTO: 24.7 K/UL — HIGH (ref 3.8–10.5)

## 2017-02-17 PROCEDURE — 99233 SBSQ HOSP IP/OBS HIGH 50: CPT

## 2017-02-17 RX ADMIN — DOCOSANOL 1 APPLICATION(S): 100 CREAM TOPICAL at 20:34

## 2017-02-17 RX ADMIN — Medication 250 MILLIGRAM(S): at 23:25

## 2017-02-17 RX ADMIN — ENOXAPARIN SODIUM 40 MILLIGRAM(S): 100 INJECTION SUBCUTANEOUS at 11:33

## 2017-02-17 RX ADMIN — DOCOSANOL 1 APPLICATION(S): 100 CREAM TOPICAL at 00:02

## 2017-02-17 RX ADMIN — Medication 200 MILLIGRAM(S): at 11:32

## 2017-02-17 RX ADMIN — Medication 100 MILLIGRAM(S): at 13:42

## 2017-02-17 RX ADMIN — Medication 3 MILLILITER(S): at 11:43

## 2017-02-17 RX ADMIN — Medication 3 MILLILITER(S): at 05:57

## 2017-02-17 RX ADMIN — Medication 200 MILLIGRAM(S): at 00:04

## 2017-02-17 RX ADMIN — Medication 100 MILLIGRAM(S): at 05:57

## 2017-02-17 RX ADMIN — Medication 20 MILLIGRAM(S): at 05:57

## 2017-02-17 RX ADMIN — DOCOSANOL 1 APPLICATION(S): 100 CREAM TOPICAL at 16:54

## 2017-02-17 RX ADMIN — Medication 81 MILLIGRAM(S): at 11:33

## 2017-02-17 RX ADMIN — Medication 250 MILLIGRAM(S): at 11:33

## 2017-02-17 RX ADMIN — ATORVASTATIN CALCIUM 10 MILLIGRAM(S): 80 TABLET, FILM COATED ORAL at 22:34

## 2017-02-17 RX ADMIN — Medication 250 MILLIGRAM(S): at 00:03

## 2017-02-17 RX ADMIN — Medication 20 MILLIGRAM(S): at 13:42

## 2017-02-17 RX ADMIN — DOCOSANOL 1 APPLICATION(S): 100 CREAM TOPICAL at 07:36

## 2017-02-17 RX ADMIN — DOCOSANOL 1 APPLICATION(S): 100 CREAM TOPICAL at 11:33

## 2017-02-17 RX ADMIN — MONTELUKAST 10 MILLIGRAM(S): 4 TABLET, CHEWABLE ORAL at 11:33

## 2017-02-17 RX ADMIN — BENZOCAINE AND MENTHOL 1 LOZENGE: 5; 1 LIQUID ORAL at 00:04

## 2017-02-17 RX ADMIN — Medication 100 MILLIGRAM(S): at 22:34

## 2017-02-17 RX ADMIN — Medication 3 MILLILITER(S): at 17:19

## 2017-02-17 RX ADMIN — Medication 20 MILLIGRAM(S): at 22:34

## 2017-02-17 RX ADMIN — DOCOSANOL 1 APPLICATION(S): 100 CREAM TOPICAL at 23:25

## 2017-02-17 NOTE — PROGRESS NOTE ADULT - PROBLEM SELECTOR PROBLEM 4
Leukocytosis, unspecified type
Herpes labialis without complication
Leukocytosis, unspecified type
Leukocytosis, unspecified type
Acute respiratory failure with hypoxemia
Essential hypertension
Herpetic lesions
Panlobular emphysema

## 2017-02-17 NOTE — PROGRESS NOTE ADULT - PROBLEM SELECTOR PROBLEM 2
Acute respiratory failure with hypoxemia
Herpes labialis without complication
Leukocytosis, unspecified type
Pneumonia of right upper lobe due to other aerobic Gram-negative bacteria
Sepsis due to Gram negative bacteria
Sepsis due to pneumococcus

## 2017-02-17 NOTE — PROGRESS NOTE ADULT - ASSESSMENT
77 y/o female with complex pneumonia of the right upper lobe who presented in sepsis slowly improving staph aureus in blood culture may be a containment  Duplex dopler of lower extremity negative for DVT     Problem/Plan - 1:  ·  Problem: Bacteremia due to Gram-positive bacteria.  Plan: likely cintominate appreciate id input.     Problem/Plan - 2:  ·  Problem: Pneumonia of right upper lobe due to other aerobic Gram-negative bacteria.  Plan: improving WBC count although back on steroids   cont vancomycin and aztreonem.   -tessalon pearls and robotussin for symptoms     Problem/Plan - 3:  ·  Problem: Herpetic lesions.  Plan: improved with antiviral topical cream.     Problem/Plan - 4:  ·  Problem: Essential hypertension.  Plan: stable.     Problem/Plan - 5:  ·  Problem: Panlobular emphysema.  Plan: on supplemental o2 continue steroid taper.

## 2017-02-17 NOTE — PROGRESS NOTE ADULT - PROBLEM SELECTOR PLAN 3
contaminant (1/2) vs BSI sec to penumonia  cont vanco
likely sec to PNA  repeat BC  cont vanco at current dose
oxygen,nebs
abg in am continue supplemental O2
improved with antiviral topical cream
improving off of steroids
still with o2 requirement appreciate pulm consult
on ava

## 2017-02-17 NOTE — PROGRESS NOTE ADULT - PROBLEM SELECTOR PROBLEM 1
Bacteremia due to Staphylococcus aureus
Pneumonia of right upper lobe due to other aerobic Gram-negative bacteria
Bacteremia due to Gram-positive bacteria
Community acquired bacterial pneumonia
Community acquired bacterial pneumonia
R/O HCAP (healthcare-associated pneumonia)

## 2017-02-17 NOTE — PROGRESS NOTE ADULT - PROBLEM SELECTOR PLAN 4
cont abreva
improving  multifactorial
improving  multifactorial
appreciate pulm consult   check o2 requirement with ABG in am
off of steroids concern that patient may need home o2
stable
will start sonam
improving  multifactorial

## 2017-02-17 NOTE — PROGRESS NOTE ADULT - PROBLEM SELECTOR PROBLEM 3
Herpes labialis without complication
Bacteremia due to Gram-positive bacteria
Bacteremia due to Staphylococcus aureus
Panlobular emphysema
Acute respiratory failure with hypoxemia
Acute respiratory failure with hypoxemia
Herpetic lesions
Leukocytosis, unspecified type

## 2017-02-17 NOTE — PROGRESS NOTE ADULT - PROBLEM SELECTOR PLAN 1
likely sec to PNA  repeat BC  cont vanco at current dose
symptmatically stable  cont vanco and azactam  sputum -not provided yet  f.u on blood culture and stret antigen  mycoplasma and leg ag neg witiin last 1 week
symptomatically stable  cont vanco and azactam  likely staph penumonia s blood culture positive
bacterial pneumonia now with positive blood cultures  appreciate id follow up  -continue vanco and aztroneem
continue with broad spectrum antibiotics
eda toro appreciate id input
patient with community aquired Pneumonia agressivly being treated with aztreonema aand vanco with improvementt in WBC
sympotmaticallay improving  cont vanco and azactam  yet to provide sputum philippke  f.u on blood culture and stret antigen  mycoplasma and leg ag neg witiin last 1 week

## 2017-02-17 NOTE — PROGRESS NOTE ADULT - PROBLEM SELECTOR PLAN 5
as above  d/c azactam on 18th  cont vanco
cont abreva
oxygen,nebs
improved with antiveral  cream
improving today with sonam
o2  and nebulizer therapy
on supplemental o2 continue steroid taper

## 2017-02-17 NOTE — PROGRESS NOTE ADULT - PROBLEM SELECTOR PLAN 2
cont abreva
improving   sec to  empysenma exacerbation/pna
improving   sec to  empysenma exacerbation/pna
could still be steroid effect will hold for now
improving WBC count although back on steroids   cont vancomycin and aztreonem
present on admission improving appreciate ID consult
will  await  sensitivities and speciation
improving   sec to  empysenma exacerbation/pna

## 2017-02-17 NOTE — PROGRESS NOTE ADULT - PROBLEM SELECTOR PROBLEM 5
Panlobular emphysema
Herpetic lesions
Panlobular emphysema
Pneumonia due to Staphylococcus aureus
Herpetic lesions
Herpetic lesions
Panlobular emphysema
Panlobular emphysema

## 2017-02-18 DIAGNOSIS — J15.211 PNEUMONIA DUE TO METHICILLIN SUSCEPTIBLE STAPHYLOCOCCUS AUREUS: ICD-10-CM

## 2017-02-18 LAB
ALBUMIN SERPL ELPH-MCNC: 1.6 G/DL — LOW (ref 3.3–5)
ALP SERPL-CCNC: 94 U/L — SIGNIFICANT CHANGE UP (ref 40–120)
ALT FLD-CCNC: 74 U/L — SIGNIFICANT CHANGE UP (ref 12–78)
ANION GAP SERPL CALC-SCNC: 2 MMOL/L — LOW (ref 5–17)
AST SERPL-CCNC: 34 U/L — SIGNIFICANT CHANGE UP (ref 15–37)
BILIRUB SERPL-MCNC: 0.4 MG/DL — SIGNIFICANT CHANGE UP (ref 0.2–1.2)
BUN SERPL-MCNC: 22 MG/DL — SIGNIFICANT CHANGE UP (ref 7–23)
CALCIUM SERPL-MCNC: 8.2 MG/DL — LOW (ref 8.5–10.1)
CHLORIDE SERPL-SCNC: 100 MMOL/L — SIGNIFICANT CHANGE UP (ref 96–108)
CO2 SERPL-SCNC: 37 MMOL/L — HIGH (ref 22–31)
CREAT SERPL-MCNC: 0.7 MG/DL — SIGNIFICANT CHANGE UP (ref 0.5–1.3)
GLUCOSE SERPL-MCNC: 186 MG/DL — HIGH (ref 70–99)
HCT VFR BLD CALC: 31.5 % — LOW (ref 34.5–45)
HGB BLD-MCNC: 10.8 G/DL — LOW (ref 11.5–15.5)
MCHC RBC-ENTMCNC: 31.8 PG — SIGNIFICANT CHANGE UP (ref 27–34)
MCHC RBC-ENTMCNC: 34.4 GM/DL — SIGNIFICANT CHANGE UP (ref 32–36)
MCV RBC AUTO: 92.4 FL — SIGNIFICANT CHANGE UP (ref 80–100)
PLATELET # BLD AUTO: 417 K/UL — HIGH (ref 150–400)
POTASSIUM SERPL-MCNC: 4.5 MMOL/L — SIGNIFICANT CHANGE UP (ref 3.5–5.3)
POTASSIUM SERPL-SCNC: 4.5 MMOL/L — SIGNIFICANT CHANGE UP (ref 3.5–5.3)
PROT SERPL-MCNC: 6.2 GM/DL — SIGNIFICANT CHANGE UP (ref 6–8.3)
RBC # BLD: 3.41 M/UL — LOW (ref 3.8–5.2)
RBC # FLD: 13.8 % — SIGNIFICANT CHANGE UP (ref 11–15)
SODIUM SERPL-SCNC: 139 MMOL/L — SIGNIFICANT CHANGE UP (ref 135–145)
WBC # BLD: 21.7 K/UL — HIGH (ref 3.8–10.5)
WBC # FLD AUTO: 21.7 K/UL — HIGH (ref 3.8–10.5)

## 2017-02-18 PROCEDURE — 99233 SBSQ HOSP IP/OBS HIGH 50: CPT

## 2017-02-18 RX ADMIN — Medication 250 MILLIGRAM(S): at 23:44

## 2017-02-18 RX ADMIN — Medication 200 MILLIGRAM(S): at 14:57

## 2017-02-18 RX ADMIN — DOCOSANOL 1 APPLICATION(S): 100 CREAM TOPICAL at 23:44

## 2017-02-18 RX ADMIN — ENOXAPARIN SODIUM 40 MILLIGRAM(S): 100 INJECTION SUBCUTANEOUS at 12:03

## 2017-02-18 RX ADMIN — DOCOSANOL 1 APPLICATION(S): 100 CREAM TOPICAL at 08:15

## 2017-02-18 RX ADMIN — DOCOSANOL 1 APPLICATION(S): 100 CREAM TOPICAL at 22:36

## 2017-02-18 RX ADMIN — Medication 20 MILLIGRAM(S): at 05:50

## 2017-02-18 RX ADMIN — Medication 20 MILLIGRAM(S): at 22:37

## 2017-02-18 RX ADMIN — Medication 3 MILLILITER(S): at 17:15

## 2017-02-18 RX ADMIN — Medication 100 MILLIGRAM(S): at 22:36

## 2017-02-18 RX ADMIN — Medication 100 MILLIGRAM(S): at 14:57

## 2017-02-18 RX ADMIN — Medication 20 MILLIGRAM(S): at 14:57

## 2017-02-18 RX ADMIN — Medication 3 MILLILITER(S): at 11:15

## 2017-02-18 RX ADMIN — DOCOSANOL 1 APPLICATION(S): 100 CREAM TOPICAL at 18:07

## 2017-02-18 RX ADMIN — Medication 81 MILLIGRAM(S): at 12:04

## 2017-02-18 RX ADMIN — Medication 3 MILLILITER(S): at 00:24

## 2017-02-18 RX ADMIN — Medication 3 MILLILITER(S): at 06:45

## 2017-02-18 RX ADMIN — Medication 3 MILLILITER(S): at 23:32

## 2017-02-18 RX ADMIN — Medication 250 MILLIGRAM(S): at 12:04

## 2017-02-18 RX ADMIN — Medication 100 MILLIGRAM(S): at 05:50

## 2017-02-18 RX ADMIN — MONTELUKAST 10 MILLIGRAM(S): 4 TABLET, CHEWABLE ORAL at 12:04

## 2017-02-18 RX ADMIN — DOCOSANOL 1 APPLICATION(S): 100 CREAM TOPICAL at 12:04

## 2017-02-18 RX ADMIN — ATORVASTATIN CALCIUM 10 MILLIGRAM(S): 80 TABLET, FILM COATED ORAL at 22:36

## 2017-02-18 RX ADMIN — Medication 100 MILLIGRAM(S): at 22:37

## 2017-02-18 NOTE — PROGRESS NOTE ADULT - ASSESSMENT
· Assessment		  75 y/o female with complex pneumonia of the right upper lobe who presented in sepsis slowly improving Duplex dopler of lower extremity negative for DVT     Problem/Plan - 1:  ·  Problem: Bacteremia due to Gram-positive bacteria.  repeat cultures negative   Problem/Plan - 2:  ·  Problem: Pneumonia of right upper lobe due to other aerobic Gram-negative bacteria.  Plan: improving WBC count although back on steroids   cont vancomycin and aztreonem.   -tessalon pearls and robotussin for symptoms     Problem/Plan - 3:  ·  Problem: Herpetic lesions.  Plan: improved with antiviral topical cream.     Problem/Plan - 4:  ·  Problem: Essential hypertension.  Plan: stable not  on meds     Problem/Plan - 5:   DVT prophalaxis   ·  Problem: Panlobular emphysema.  Plan: on supplemental o2 continue steroid taper.

## 2017-02-19 VITALS — OXYGEN SATURATION: 96 %

## 2017-02-19 LAB
ALBUMIN SERPL ELPH-MCNC: 1.7 G/DL — LOW (ref 3.3–5)
ALP SERPL-CCNC: 91 U/L — SIGNIFICANT CHANGE UP (ref 40–120)
ALT FLD-CCNC: 63 U/L — SIGNIFICANT CHANGE UP (ref 12–78)
ANION GAP SERPL CALC-SCNC: 5 MMOL/L — SIGNIFICANT CHANGE UP (ref 5–17)
AST SERPL-CCNC: 15 U/L — SIGNIFICANT CHANGE UP (ref 15–37)
BILIRUB SERPL-MCNC: 0.4 MG/DL — SIGNIFICANT CHANGE UP (ref 0.2–1.2)
BUN SERPL-MCNC: 21 MG/DL — SIGNIFICANT CHANGE UP (ref 7–23)
CALCIUM SERPL-MCNC: 8 MG/DL — LOW (ref 8.5–10.1)
CHLORIDE SERPL-SCNC: 102 MMOL/L — SIGNIFICANT CHANGE UP (ref 96–108)
CO2 SERPL-SCNC: 34 MMOL/L — HIGH (ref 22–31)
CREAT SERPL-MCNC: 0.67 MG/DL — SIGNIFICANT CHANGE UP (ref 0.5–1.3)
GLUCOSE SERPL-MCNC: 174 MG/DL — HIGH (ref 70–99)
HCT VFR BLD CALC: 29.5 % — LOW (ref 34.5–45)
HGB BLD-MCNC: 10.3 G/DL — LOW (ref 11.5–15.5)
MCHC RBC-ENTMCNC: 31.3 PG — SIGNIFICANT CHANGE UP (ref 27–34)
MCHC RBC-ENTMCNC: 35 GM/DL — SIGNIFICANT CHANGE UP (ref 32–36)
MCV RBC AUTO: 89.4 FL — SIGNIFICANT CHANGE UP (ref 80–100)
PLATELET # BLD AUTO: 436 K/UL — HIGH (ref 150–400)
POTASSIUM SERPL-MCNC: 3.9 MMOL/L — SIGNIFICANT CHANGE UP (ref 3.5–5.3)
POTASSIUM SERPL-SCNC: 3.9 MMOL/L — SIGNIFICANT CHANGE UP (ref 3.5–5.3)
PROT SERPL-MCNC: 5.9 GM/DL — LOW (ref 6–8.3)
RBC # BLD: 3.3 M/UL — LOW (ref 3.8–5.2)
RBC # FLD: 13.1 % — SIGNIFICANT CHANGE UP (ref 11–15)
SODIUM SERPL-SCNC: 141 MMOL/L — SIGNIFICANT CHANGE UP (ref 135–145)
WBC # BLD: 20.7 K/UL — HIGH (ref 3.8–10.5)
WBC # FLD AUTO: 20.7 K/UL — HIGH (ref 3.8–10.5)

## 2017-02-19 PROCEDURE — 99239 HOSP IP/OBS DSCHRG MGMT >30: CPT

## 2017-02-19 RX ORDER — IBUPROFEN 200 MG
1 TABLET ORAL
Qty: 0 | Refills: 0 | COMMUNITY
Start: 2017-02-19

## 2017-02-19 RX ORDER — DOCOSANOL 100 MG/G
1 CREAM TOPICAL
Qty: 0 | Refills: 0 | COMMUNITY
Start: 2017-02-19

## 2017-02-19 RX ORDER — MONTELUKAST 4 MG/1
1 TABLET, CHEWABLE ORAL
Qty: 30 | Refills: 0 | OUTPATIENT
Start: 2017-02-19 | End: 2017-03-21

## 2017-02-19 RX ORDER — BENZOCAINE AND MENTHOL 5; 1 G/100ML; G/100ML
1 LIQUID ORAL
Qty: 15 | Refills: 0 | OUTPATIENT
Start: 2017-02-19

## 2017-02-19 RX ADMIN — Medication 3 MILLILITER(S): at 11:41

## 2017-02-19 RX ADMIN — ENOXAPARIN SODIUM 40 MILLIGRAM(S): 100 INJECTION SUBCUTANEOUS at 11:08

## 2017-02-19 RX ADMIN — Medication 250 MILLIGRAM(S): at 11:08

## 2017-02-19 RX ADMIN — Medication 81 MILLIGRAM(S): at 11:09

## 2017-02-19 RX ADMIN — Medication 100 MILLIGRAM(S): at 05:11

## 2017-02-19 RX ADMIN — Medication 20 MILLIGRAM(S): at 05:11

## 2017-02-19 RX ADMIN — Medication 200 MILLIGRAM(S): at 08:11

## 2017-02-19 RX ADMIN — Medication 3 MILLILITER(S): at 05:55

## 2017-02-19 RX ADMIN — DOCOSANOL 1 APPLICATION(S): 100 CREAM TOPICAL at 08:11

## 2017-02-19 RX ADMIN — MONTELUKAST 10 MILLIGRAM(S): 4 TABLET, CHEWABLE ORAL at 11:08

## 2017-02-19 RX ADMIN — DOCOSANOL 1 APPLICATION(S): 100 CREAM TOPICAL at 11:09

## 2017-02-19 NOTE — DISCHARGE NOTE ADULT - PLAN OF CARE
has completed 7 day course of antibiotics follow up with PMD had oxygen requirement now going home without oxygen

## 2017-02-19 NOTE — PROGRESS NOTE ADULT - PROVIDER SPECIALTY LIST ADULT
Hospitalist
Infectious Disease
Pulmonology

## 2017-02-19 NOTE — DISCHARGE NOTE ADULT - MEDICATION SUMMARY - MEDICATIONS TO TAKE
I will START or STAY ON the medications listed below when I get home from the hospital:    Deltasone 20 mg oral tablet  -- 1 tab(s) by mouth once a day -for bronchospasm  -- It is very important that you take or use this exactly as directed.  Do not skip doses or discontinue unless directed by your doctor.  Obtain medical advice before taking any non-prescription drugs as some may affect the action of this medication.  Take with food or milk.    -- Indication: For Copd    acetaminophen 325 mg oral tablet  -- 2 tab(s) by mouth every 6 hours, As needed, For Temp greater than 38 C (100.4 F)  -- Indication: For Pain fever    ibuprofen 600 mg oral tablet  -- 1 tab(s) by mouth every 6 hours, As needed, moderate pain  -- Indication: For Pain    aspirin 81 mg oral tablet, chewable  -- 1 tab(s) by mouth once a day  -- Indication: For Heart    acetaminophen 325 mg oral tablet  -- 2 tab(s) by mouth every 6 hours, As needed, For Temp greater than 38 C (100.4 F)  -- Indication: For Pain    atorvastatin 10 mg oral tablet  -- 1 tab(s) by mouth once a day (at bedtime)  -- Indication: For Hypercholesteremia    benzonatate 100 mg oral capsule  -- 1 cap(s) by mouth 4 times a day  -- Indication: For Cough    benzonatate 100 mg oral capsule  -- 1 cap(s) by mouth 4 times a day  -- Indication: For Cough    benzonatate 200 mg oral capsule  -- 1 cap(s) by mouth 3 times a day  -- Indication: For Cough    albuterol-ipratropium 2.5 mg-0.5 mg/3 mL inhalation solution  -- 3 milliliter(s) inhaled every 6 hours  -- Indication: For CoPD    albuterol CFC free 90 mcg/inh inhalation aerosol  -- 2 puff(s) inhaled every 6 hours, As needed, Shortness of Breath  -- Indication: For CoPD    ProAir HFA 90 mcg/inh inhalation aerosol  -- 2 puff(s) inhaled 4 times a day  -- Indication: For CoPD    docosanol 10% topical cream  -- 1 application on skin 5 times a day  -- Indication: For Skin    guaiFENesin 100 mg/5 mL oral liquid  -- 10 milliliter(s) by mouth every 6 hours, As needed, Cough  -- Indication: For Cough    guaiFENesin 100 mg/5 mL oral liquid  -- 10 milliliter(s) by mouth every 6 hours, As needed, Cough  -- Indication: For Cough    montelukast 10 mg oral tablet  -- 1 tab(s) by mouth once a day  -- Indication: For CoPD    benzocaine-menthol 15 mg-3.6 mg mucous membrane lozenge  -- 1 dose(s) mucous membrane every 8 hours  -- Indication: For Sore throat    Acidophilus Probiotic Blend oral capsule  -- 1 cap(s) by mouth once a day  -- Indication: For Pneumonia     Levaquin 750 mg oral tablet  -- 1 tab(s) by mouth once a day  -- Avoid prolonged or excessive exposure to direct and/or artificial sunlight while taking this medication.  Do not take dairy products, antacids, or iron preparations within one hour of this medication.  Finish all this medication unless otherwise directed by prescriber.  May cause drowsiness or dizziness.  Medication should be taken with plenty of water.    -- Indication: For Pneumonia    guaiFENesin-dextromethorphan 100mg-10mg/5 mL oral liquid  -- 10 milliliter(s) by mouth every 4 hours  -- Medication should be taken with plenty of water.    -- Indication: For Cough    Mucinex D Max Strength 120 mg-1200 mg oral tablet, extended release  -- 1 tab(s) by mouth 2 times a day  -- Medication should be taken with plenty of water.  Swallow whole.  Do not crush.    -- Indication: For Cough    Vitamin C 500 mg oral tablet  -- 1 tab(s) by mouth once a day  -- Indication: For Supplement    Vitamin B12 1000 mcg oral tablet  -- 1 tab(s) by mouth once a day  -- Indication: For Supplement

## 2017-02-19 NOTE — DISCHARGE NOTE ADULT - HOSPITAL COURSE
75yo woman with recent PNA RUL, COPD/Emphysema ex tobacco use, HTN, HLD,   discharged 2 days ago after being admitted for RUL pneumonia and COPD exacerbation. Again presented to ED due to  worsened dyspnea on exertion - otherwise feeling worse at home past day.	  Admitted from 2/6/17 through 2/10/17, for three day history of SOB and ARAGON Provider at that time stated: "Patient denied sick contacts or recent travel. has two pets at home (dog and bird). Patient denied fever but has increased cough productive of green sputum.  Dx was right upper lobe pneumonia treated with a course of Levaquin and steroid dose for her emphysema. Patient did have an oxygen requirement while in the hospital but declined home 02."  Now Admitted with Worsening RUL infiltrate and hypoxia. Smoked for 40 years, quit 15 years ago.  Cough persists but improving.    Course was highlighted with switch to vanco and aztreonem antibiotics for a full seven day course

## 2017-02-19 NOTE — DISCHARGE NOTE ADULT - CARE PLAN
Principal Discharge DX:	Pneumonia of right upper lobe due to other aerobic Gram-negative bacteria  Goal:	has completed 7 day course of antibiotics  Instructions for follow-up, activity and diet:	follow up with PMD  Secondary Diagnosis:	Sepsis due to Gram negative bacteria  Secondary Diagnosis:	Acute respiratory failure with hypoxemia  Goal:	had oxygen requirement now going home without oxygen

## 2017-02-19 NOTE — PROGRESS NOTE ADULT - SUBJECTIVE AND OBJECTIVE BOX
INTERVAL HPI/OVERNIGHT EVENTS:  75yo woman with recent PNA RUL, COPD/Emphysema ex tobacco use, HTN, HLD,   discharged 2 days ago after being admitted for RUL pneumonia and COPD exacerbation. Again presented to ED due to  worsened dyspnea on exertion - otherwise feeling worse at home past day.	  Admitted from 2/6/17 through 2/10/17, for three day history of SOB and ARAGON Provider at that time stated: "Patient denied sick contacts or recent travel. has two pets at home (dog and bird). Patient denied fever but has increased cough productive of green sputum.  Dx was right upper lobe pneumonia treated with a course of Levaquin and steroid dose for her emphysema. Patient did have an oxygen requirement while in the hospital but declined home 02."  Now Admitted with Worsening RUL infiltrate and hypoxia. Smoked for 40 years, quit 15 years ago.  Complains of sob, weakness and tiredness.    Vital Signs Last 24 Hrs  T(C): 37.1, Max: 37.2 (02-14 @ 06:11)  T(F): 98.8, Max: 99 (02-14 @ 06:11)  HR: 97 (92 - 104)  BP: 115/66 (115/66 - 139/77)  BP(mean): --  RR: 18 (16 - 18)  SpO2: 92% (92% - 100%)    PHYSICAL EXAM:  GEN:        Awake, responsive and comfortable.  HEENT:    Normal.    RESP:     scattered wheezing.  CVS:         Regular rate and rhythm.   ABD:         Soft, non-tender, non-distended;   :             No costovertebral angle tenderness  EXTR:            No clubbing, cyanosis or edema  CNS:              Intact sensory and motor function.    MEDICATIONS  (STANDING):  aspirin  chewable 81milliGRAM(s) Oral daily  atorvastatin 10milliGRAM(s) Oral at bedtime  benzonatate 100milliGRAM(s) Oral three times a day  enoxaparin Injectable 40milliGRAM(s) SubCutaneous daily  ALBUTerol/ipratropium for Nebulization 3milliLiter(s) Nebulizer every 6 hours  ALBUTerol    90 MICROgram(s) HFA Inhaler 1Puff(s) Inhalation every 4 hours  tiotropium 18 MICROgram(s) Capsule 1Capsule(s) Inhalation daily  vancomycin  IVPB 1000milliGRAM(s) IV Intermittent every 12 hours  aztreonam  IVPB 2000milliGRAM(s) IV Intermittent every 8 hours  docosanol 10% Cream 1Application(s) Topical five times a day    MEDICATIONS  (PRN):  acetaminophen   Tablet. 650milliGRAM(s) Oral every 6 hours PRN For Temp greater than 38 C (100.4 F)  acetaminophen   Tablet. 650milliGRAM(s) Oral every 6 hours PRN Mild Pain (1 - 3)  guaiFENesin   Syrup  (Sugar-Free) 200milliGRAM(s) Oral every 6 hours PRN Cough    LABS:                        11.4   34.2  )-----------( 391      ( 14 Feb 2017 07:12 )             33.6     14 Feb 2017 07:12    141    |  102    |  28     ----------------------------<  101    3.6     |  31     |  0.79     Ca    8.7        14 Feb 2017 07:12  Mg     2.4       13 Feb 2017 10:29    TPro  6.5    /  Alb  1.6    /  TBili  0.3    /  DBili  x      /  AST  49     /  ALT  89     /  AlkPhos  130    14 Feb 2017 07:12      ABG - 02-14 @ 08:15  pH: 7.50 / pcO2: 42 / 61 on 2 litres.    ASSESSMENT AND PLAN:  ·	SOB.  ·	Hypoxia.  ·	RUL pneumonia.  ·	Leukocytosis.  ·	COPD exacerbation.  ·	HTN  ·	HLD    Continue antibiotics, on Azactam and Vancomycin.  Continue nebulizer and Spiriva.  Will add Solumedrol and Singulair.
INTERVAL HPI/OVERNIGHT EVENTS:  75yo woman with recent PNA RUL, COPD/Emphysema ex tobacco use, HTN, HLD,   discharged 2 days ago after being admitted for RUL pneumonia and COPD exacerbation. Again presented to ED due to  worsened dyspnea on exertion - otherwise feeling worse at home past day.	  Admitted from 2/6/17 through 2/10/17, for three day history of SOB and ARAGON Provider at that time stated: "Patient denied sick contacts or recent travel. has two pets at home (dog and bird). Patient denied fever but has increased cough productive of green sputum.  Dx was right upper lobe pneumonia treated with a course of Levaquin and steroid dose for her emphysema. Patient did have an oxygen requirement while in the hospital but declined home 02."  Now Admitted with Worsening RUL infiltrate and hypoxia. Smoked for 40 years, quit 15 years ago.  Cough persists but improving.    Vital Signs Last 24 Hrs  T(C): 36.1, Max: 37.1 (02-16 @ 23:15)  T(F): 97, Max: 98.8 (02-16 @ 23:15)  HR: 90 (76 - 95)  BP: 124/53 (106/67 - 137/64)  BP(mean): --  RR: 17 (17 - 20)  SpO2: 96% (91% - 97%)    PHYSICAL EXAM:  GEN:         Awake, responsive and comfortable.  HEENT:    Normal.    RESP:       no wheezing.  CVS:         Regular rate and rhythm.   ABD:         Soft, non-tender, non-distended;   :             No costovertebral angle tenderness  EXTR:            No clubbing, cyanosis or edema  CNS:              Intact sensory and motor function.    MEDICATIONS  (STANDING):  aspirin  chewable 81milliGRAM(s) Oral daily  atorvastatin 10milliGRAM(s) Oral at bedtime  enoxaparin Injectable 40milliGRAM(s) SubCutaneous daily  ALBUTerol/ipratropium for Nebulization 3milliLiter(s) Nebulizer every 6 hours  ALBUTerol    90 MICROgram(s) HFA Inhaler 1Puff(s) Inhalation every 4 hours  tiotropium 18 MICROgram(s) Capsule 1Capsule(s) Inhalation daily  vancomycin  IVPB 1000milliGRAM(s) IV Intermittent every 12 hours  aztreonam  IVPB 2000milliGRAM(s) IV Intermittent every 8 hours  docosanol 10% Cream 1Application(s) Topical five times a day  montelukast 10milliGRAM(s) Oral daily  methylPREDNISolone sodium succinate Injectable 20milliGRAM(s) IV Push every 8 hours    MEDICATIONS  (PRN):  acetaminophen   Tablet. 650milliGRAM(s) Oral every 6 hours PRN For Temp greater than 38 C (100.4 F)  acetaminophen   Tablet. 650milliGRAM(s) Oral every 6 hours PRN Mild Pain (1 - 3)  guaiFENesin   Syrup  (Sugar-Free) 200milliGRAM(s) Oral every 6 hours PRN Cough  benzocaine 15 mG/menthol 3.6 mG Lozenge 1Lozenge Oral four times a day PRN Sore Throat  ibuprofen  Tablet 600milliGRAM(s) Oral every 6 hours PRN moderate pain    LABS:                        9.9    24.7  )-----------( 422      ( 17 Feb 2017 07:57 )             29.2     17 Feb 2017 07:57    140    |  101    |  27     ----------------------------<  199    4.0     |  35     |  0.79     Ca    8.3        17 Feb 2017 07:57    TPro  5.7    /  Alb  1.5    /  TBili  0.3    /  DBili  x      /  AST  28     /  ALT  70     /  AlkPhos  91     17 Feb 2017 07:57    ASSESSMENT AND PLAN:  ·	SOB. metter.  ·	Hypoxia. on O2  ·	RUL pneumonia.  ·	Leukocytosis.  ·	COPD exacerbation.  ·	HTN  ·	HLD    On broad spectrum antibiotics.  Continue O2, taper steroids.
INTERVAL HPI/OVERNIGHT EVENTS:  75yo woman with recent PNA RUL, COPD/Emphysema ex tobacco use, HTN, HLD,   discharged 2 days ago after being admitted for RUL pneumonia and COPD exacerbation. Again presented to ED due to  worsened dyspnea on exertion - otherwise feeling worse at home past day.	  Admitted from 2/6/17 through 2/10/17, for three day history of SOB and ARAGON Provider at that time stated: "Patient denied sick contacts or recent travel. has two pets at home (dog and bird). Patient denied fever but has increased cough productive of green sputum.  Dx was right upper lobe pneumonia treated with a course of Levaquin and steroid dose for her emphysema. Patient did have an oxygen requirement while in the hospital but declined home 02."  Now Admitted with Worsening RUL infiltrate and hypoxia. Smoked for 40 years, quit 15 years ago.  Reports sore throat.    Vital Signs Last 24 Hrs  T(C): 36.4, Max: 37.8 (02-14 @ 17:08)  T(F): 97.6, Max: 100 (02-14 @ 17:08)  HR: 87 (84 - 105)  BP: 108/73 (108/73 - 135/67)  BP(mean): --  RR: 19 (18 - 20)  SpO2: 93% (93% - 97%)    PHYSICAL EXAM:  GEN:         Awake, responsive and comfortable.  HEENT:    Normal.    RESP:     occasional wheezing.  CVS:         Regular rate and rhythm.   ABD:         Soft, non-tender, non-distended;   :             No costovertebral angle tenderness  EXTR:            No clubbing, cyanosis or edema  CNS:              Intact sensory and motor function.    MEDICATIONS  (STANDING):  aspirin  chewable 81milliGRAM(s) Oral daily  atorvastatin 10milliGRAM(s) Oral at bedtime  benzonatate 100milliGRAM(s) Oral three times a day  enoxaparin Injectable 40milliGRAM(s) SubCutaneous daily  ALBUTerol/ipratropium for Nebulization 3milliLiter(s) Nebulizer every 6 hours  ALBUTerol    90 MICROgram(s) HFA Inhaler 1Puff(s) Inhalation every 4 hours  tiotropium 18 MICROgram(s) Capsule 1Capsule(s) Inhalation daily  vancomycin  IVPB 1000milliGRAM(s) IV Intermittent every 12 hours  aztreonam  IVPB 2000milliGRAM(s) IV Intermittent every 8 hours  docosanol 10% Cream 1Application(s) Topical five times a day  methylPREDNISolone sodium succinate Injectable 40milliGRAM(s) IV Push every 8 hours  montelukast 10milliGRAM(s) Oral daily    MEDICATIONS  (PRN):  acetaminophen   Tablet. 650milliGRAM(s) Oral every 6 hours PRN For Temp greater than 38 C (100.4 F)  acetaminophen   Tablet. 650milliGRAM(s) Oral every 6 hours PRN Mild Pain (1 - 3)  guaiFENesin   Syrup  (Sugar-Free) 200milliGRAM(s) Oral every 6 hours PRN Cough    LABS:                        11.2   27.2  )-----------( 366      ( 15 Feb 2017 07:16 )             32.1     15 Feb 2017 07:16    139    |  100    |  24     ----------------------------<  210    3.6     |  34     |  0.76     Ca    8.2        15 Feb 2017 07:16    TPro  6.1    /  Alb  1.4    /  TBili  0.3    /  DBili  x      /  AST  29     /  ALT  66     /  AlkPhos  106    15 Feb 2017 07:16    ASSESSMENT AND PLAN:  ·	SOB.  ·	Hypoxia.  ·	RUL pneumonia.  ·	Leukocytosis.  ·	COPD exacerbation.  ·	HTN  ·	HLD    Continue treatment.  Cepacol lozenges.
INTERVAL HPI/OVERNIGHT EVENTS:  77yo woman with recent PNA RUL, COPD/Emphysema ex tobacco use, HTN, HLD,   discharged 2 days ago after being admitted for RUL pneumonia and COPD exacerbation. Again presented to ED due to  worsened dyspnea on exertion - otherwise feeling worse at home past day.	  Admitted from 2/6/17 through 2/10/17, for three day history of SOB and ARAGON Provider at that time stated: "Patient denied sick contacts or recent travel. has two pets at home (dog and bird). Patient denied fever but has increased cough productive of green sputum.  Dx was right upper lobe pneumonia treated with a course of Levaquin and steroid dose for her emphysema. Patient did have an oxygen requirement while in the hospital but declined home 02."  Now Admitted with Worsening RUL infiltrate and hypoxia. Smoked for 40 years, quit 15 years ago.  Reports sore throat and right leg swelling.. SOB is better.    Vital Signs Last 24 Hrs  T(C): 36.9, Max: 37.1 (02-15 @ 16:39)  T(F): 98.4, Max: 98.8 (02-15 @ 16:39)  HR: 83 (78 - 95)  BP: 109/66 (108/73 - 140/65)  BP(mean): --  RR: 19 (18 - 20)  SpO2: 95% (93% - 97%)    PHYSICAL EXAM:  GEN:         Awake, responsive and comfortable.  HEENT:    Normal.    RESP:       no wheezing.  CVS:          Regular rate and rhythm.   ABD:         Soft, non-tender, non-distended;   :            No costovertebral angle tenderness  EXTR:        swelling of right leg  CNS:           Intact sensory and motor function.    MEDICATIONS  (STANDING):  aspirin  chewable 81milliGRAM(s) Oral daily  atorvastatin 10milliGRAM(s) Oral at bedtime  enoxaparin Injectable 40milliGRAM(s) SubCutaneous daily  ALBUTerol/ipratropium for Nebulization 3milliLiter(s) Nebulizer every 6 hours  ALBUTerol    90 MICROgram(s) HFA Inhaler 1Puff(s) Inhalation every 4 hours  tiotropium 18 MICROgram(s) Capsule 1Capsule(s) Inhalation daily  vancomycin  IVPB 1000milliGRAM(s) IV Intermittent every 12 hours  aztreonam  IVPB 2000milliGRAM(s) IV Intermittent every 8 hours  docosanol 10% Cream 1Application(s) Topical five times a day  methylPREDNISolone sodium succinate Injectable 40milliGRAM(s) IV Push every 8 hours  montelukast 10milliGRAM(s) Oral daily    MEDICATIONS  (PRN):  acetaminophen   Tablet. 650milliGRAM(s) Oral every 6 hours PRN For Temp greater than 38 C (100.4 F)  acetaminophen   Tablet. 650milliGRAM(s) Oral every 6 hours PRN Mild Pain (1 - 3)  guaiFENesin   Syrup  (Sugar-Free) 200milliGRAM(s) Oral every 6 hours PRN Cough  benzocaine 15 mG/menthol 3.6 mG Lozenge 1Lozenge Oral four times a day PRN Sore Throat  ibuprofen  Tablet 600milliGRAM(s) Oral every 6 hours PRN moderate pain    LABS:                        10.4   25.4  )-----------( 342      ( 16 Feb 2017 07:34 )             29.5     16 Feb 2017 07:34    141    |  100    |  26     ----------------------------<  250    3.7     |  36     |  0.74     Ca    8.3        16 Feb 2017 07:34    TPro  6.0    /  Alb  1.4    /  TBili  0.3    /  DBili  x      /  AST  24     /  ALT  63     /  AlkPhos  93     16 Feb 2017 07:34      ABG - 02-16 @ 08:03  pH: 7.47 / pO2: 48 / HCO3: 34 / Base Excess: 9.3 / SaO2: 94    ASSESSMENT AND PLAN:  ·	SOB. improving.  ·	Hypoxia.  ·	RUL pneumonia.  ·	Leukocytosis.  ·	COPD exacerbation.  ·	HTN  ·	HLD  ·	Right leg swelling.    For LE duplex. Will add  Cepacol lozenges.  Reduce steroids.  Continue antibiotics.
INTERVAL HPI/OVERNIGHT EVENTS:  77yo woman with recent PNA RUL, COPD/Emphysema ex tobacco use, HTN, HLD,   discharged 2 days ago after being admitted for RUL pneumonia and COPD exacerbation. Again presented to ED due to  worsened dyspnea on exertion - otherwise feeling worse at home past day.	  Admitted from 2/6/17 through 2/10/17, for three day history of SOB and ARAGON Provider at that time stated: "Patient denied sick contacts or recent travel. has two pets at home (dog and bird). Patient denied fever but has increased cough productive of green sputum.  Dx was right upper lobe pneumonia treated with a course of Levaquin and steroid dose for her emphysema. Patient did have an oxygen requirement while in the hospital but declined home 02."  Now Admitted with Worsening RUL infiltrate and hypoxia. Smoked for 40 years, quit 15 years ago.  Still has some cough.    Vital Signs Last 24 Hrs  T(C): 37, Max: 37.2 (02-18 @ 23:20)  T(F): 98.6, Max: 99 (02-18 @ 23:20)  HR: 84 (80 - 93)  BP: 138/70 (129/79 - 141/76)  BP(mean): --  RR: 17 (17 - 18)  SpO2: 96% (91% - 96%)    PHYSICAL EXAM:  GEN:         Awake, responsive and comfortable.  HEENT:    Normal.    RESP:      no wheezing.  CVS:          Regular rate and rhythm.   ABD:         Soft, non-tender, non-distended;   :             No costovertebral angle tenderness  EXTR:            No clubbing, cyanosis or edema  CNS:              Intact sensory and motor function.    MEDICATIONS  (STANDING):  aspirin  chewable 81milliGRAM(s) Oral daily  atorvastatin 10milliGRAM(s) Oral at bedtime  enoxaparin Injectable 40milliGRAM(s) SubCutaneous daily  ALBUTerol/ipratropium for Nebulization 3milliLiter(s) Nebulizer every 6 hours  ALBUTerol    90 MICROgram(s) HFA Inhaler 1Puff(s) Inhalation every 4 hours  tiotropium 18 MICROgram(s) Capsule 1Capsule(s) Inhalation daily  vancomycin  IVPB 1000milliGRAM(s) IV Intermittent every 12 hours  docosanol 10% Cream 1Application(s) Topical five times a day  montelukast 10milliGRAM(s) Oral daily  methylPREDNISolone sodium succinate Injectable 20milliGRAM(s) IV Push every 8 hours  benzonatate 100milliGRAM(s) Oral three times a day    MEDICATIONS  (PRN):  acetaminophen   Tablet. 650milliGRAM(s) Oral every 6 hours PRN For Temp greater than 38 C (100.4 F)  acetaminophen   Tablet. 650milliGRAM(s) Oral every 6 hours PRN Mild Pain (1 - 3)  guaiFENesin   Syrup  (Sugar-Free) 200milliGRAM(s) Oral every 6 hours PRN Cough  benzocaine 15 mG/menthol 3.6 mG Lozenge 1Lozenge Oral four times a day PRN Sore Throat  ibuprofen  Tablet 600milliGRAM(s) Oral every 6 hours PRN moderate pain    LABS:                        10.3   20.7  )-----------( 436      ( 19 Feb 2017 07:35 )             29.5     19 Feb 2017 07:35    141    |  102    |  21     ----------------------------<  174    3.9     |  34     |  0.67     Ca    8.0        19 Feb 2017 07:35    TPro  5.9    /  Alb  1.7    /  TBili  0.4    /  DBili  x      /  AST  15     /  ALT  63     /  AlkPhos  91     19 Feb 2017 07:35    ASSESSMENT AND PLAN:  ·	SOB. better.  ·	Hypoxia. on O2  ·	RUL pneumonia.  ·	Leukocytosis. on steroids,  ·	COPD exacerbation.  ·	HTN  ·	HLD    Complete antibiotics per ID, taper steroids.  Continue nebulizer.  Follow up CXR/CT in 6-8 weeks.
INTERVAL HPI/OVERNIGHT EVENTS:  77yo woman with recent PNA RUL, COPD/Emphysema ex tobacco use, HTN, HLD,   discharged 2 days ago after being admitted for RUL pneumonia and COPD exacerbation. Again presented to ED due to  worsened dyspnea on exertion - otherwise feeling worse at home past day.	  Admitted from 2/6/17 through 2/10/17, for three day history of SOB and ARAGON Provider at that time stated: "Patient denied sick contacts or recent travel. has two pets at home (dog and bird). Patient denied fever but has increased cough productive of green sputum.  Dx was right upper lobe pneumonia treated with a course of Levaquin and steroid dose for her emphysema. Patient did have an oxygen requirement while in the hospital but declined home 02."  Now Admitted with Worsening RUL infiltrate and hypoxia.Smoked for 40 years, quit 15 years ago.  Says feels so so    Vital Signs Last 24 Hrs  T(C): 36.6, Max: 36.6 (02-12 @ 23:57)  T(F): 97.8, Max: 97.8 (02-12 @ 23:57)  HR: 96 (76 - 96)  BP: 119/66 (119/66 - 128/75)  BP(mean): --  RR: 17 (16 - 17)  SpO2: 94% (93% - 96%)    PHYSICAL EXAM:  GEN:        Awake, responsive and comfortable.  HEENT:    Normal.    RESP:      crackles.  CVS:         Regular rate and rhythm.   ABD:         Soft, non-tender, non-distended;   :             No costovertebral angle tenderness  EXTR:            No clubbing, cyanosis or edema  CNS:              Intact sensory and motor function.    MEDICATIONS  (STANDING):  aspirin  chewable 81milliGRAM(s) Oral daily  atorvastatin 10milliGRAM(s) Oral at bedtime  benzonatate 100milliGRAM(s) Oral three times a day  enoxaparin Injectable 40milliGRAM(s) SubCutaneous daily  ALBUTerol/ipratropium for Nebulization 3milliLiter(s) Nebulizer every 6 hours  ALBUTerol    90 MICROgram(s) HFA Inhaler 1Puff(s) Inhalation every 4 hours  tiotropium 18 MICROgram(s) Capsule 1Capsule(s) Inhalation daily  vancomycin  IVPB 1000milliGRAM(s) IV Intermittent every 12 hours  aztreonam  IVPB 2000milliGRAM(s) IV Intermittent every 8 hours  docosanol 10% Cream 1Application(s) Topical five times a day    MEDICATIONS  (PRN):  acetaminophen   Tablet. 650milliGRAM(s) Oral every 6 hours PRN For Temp greater than 38 C (100.4 F)  acetaminophen   Tablet. 650milliGRAM(s) Oral every 6 hours PRN Mild Pain (1 - 3)  guaiFENesin   Syrup  (Sugar-Free) 200milliGRAM(s) Oral every 6 hours PRN Cough    LABS:                        11.6   41.6  )-----------( 407      ( 13 Feb 2017 10:29 )             33.2     13 Feb 2017 10:29    141    |  102    |  29     ----------------------------<  254    3.8     |  35     |  0.85     Ca    8.8        13 Feb 2017 10:29  Mg     2.4       13 Feb 2017 10:29    TPro  6.4    /  Alb  1.9    /  TBili  0.4    /  DBili  x      /  AST  45     /  ALT  86     /  AlkPhos  112    12 Feb 2017 10:55    PT/INR - ( 12 Feb 2017 10:55 )   PT: 15.4 sec;   INR: 1.37 ratio         PTT - ( 12 Feb 2017 10:55 )  PTT:27.9 sec    ASSESSMENT AND PLAN:  ·	SOB.  ·	Hypoxia.  ·	RUL pneumonia.  ·	Leukocytosis.  ·	COPD exacerbation.  ·	HTN  ·	HLD    Continue antibiotics and nebulizer.  Consider ID evaluation.
Patient is a 76y old  Female who presents with a chief complaint of SOB (12 Feb 2017 12:11)      INTERVAL HPI / OVERNIGHT EVENTS: breathing is slightly improved, no fever ,has some chills and sweating,chest pain improving    MEDICATIONS  (STANDING):  aspirin  chewable 81milliGRAM(s) Oral daily  atorvastatin 10milliGRAM(s) Oral at bedtime  benzonatate 100milliGRAM(s) Oral three times a day  enoxaparin Injectable 40milliGRAM(s) SubCutaneous daily  ALBUTerol/ipratropium for Nebulization 3milliLiter(s) Nebulizer every 6 hours  ALBUTerol    90 MICROgram(s) HFA Inhaler 1Puff(s) Inhalation every 4 hours  tiotropium 18 MICROgram(s) Capsule 1Capsule(s) Inhalation daily  vancomycin  IVPB 1000milliGRAM(s) IV Intermittent every 12 hours  aztreonam  IVPB 2000milliGRAM(s) IV Intermittent every 8 hours  docosanol 10% Cream 1Application(s) Topical five times a day  methylPREDNISolone sodium succinate Injectable 40milliGRAM(s) IV Push every 8 hours  montelukast 10milliGRAM(s) Oral daily    MEDICATIONS  (PRN):  acetaminophen   Tablet. 650milliGRAM(s) Oral every 6 hours PRN For Temp greater than 38 C (100.4 F)  acetaminophen   Tablet. 650milliGRAM(s) Oral every 6 hours PRN Mild Pain (1 - 3)  guaiFENesin   Syrup  (Sugar-Free) 200milliGRAM(s) Oral every 6 hours PRN Cough      Vital Signs Last 24 Hrs  T(C): 37.8, Max: 37.8 (02-14 @ 17:08)  T(F): 100, Max: 100 (02-14 @ 17:08)  HR: 102 (92 - 105)  BP: 128/74 (115/66 - 139/77)  BP(mean): --  RR: 20 (16 - 20)  SpO2: 94% (92% - 100%)    PHYSICAL EXAM:    Constitutional: NAD, well-groomed, well-developed  Respiratory: CTAB/L  Cardiovascular: S1 and S2, RRR, no M/G/R  Gastrointestinal: BS+, soft, NT/ND  Extremities: No peripheral edema  Vascular: 2+ peripheral pulses  Skin: No rashes      LABS:                        11.4   34.2  )-----------( 391      ( 14 Feb 2017 07:12 )             33.6     14 Feb 2017 07:12    141    |  102    |  28     ----------------------------<  101    3.6     |  31     |  0.79     Ca    8.7        14 Feb 2017 07:12  Mg     2.4       13 Feb 2017 10:29    TPro  6.5    /  Alb  1.6    /  TBili  0.3    /  DBili  x      /  AST  49     /  ALT  89     /  AlkPhos  130    14 Feb 2017 07:12            MICROBIOLOGY:  RECENT CULTURES:  02-12 .Blood Blood XXXX XXXX   No growth to date.    02-08 .Urine Clean Catch (Midstream) XXXX XXXX   <10,000 CFU/ml  Normal Urogenital alberto present    02-08 .Blood Blood-Peripheral XXXX XXXX   No growth at 5 days.          RADIOLOGY & ADDITIONAL STUDIES:
Patient is a 76y old  Female who presents with a chief complaint of SOB (12 Feb 2017 12:11)      INTERVAL HPI / OVERNIGHT EVENTS: chest pain with cough ,shortness of breath -same, no fever    MEDICATIONS  (STANDING):  aspirin  chewable 81milliGRAM(s) Oral daily  atorvastatin 10milliGRAM(s) Oral at bedtime  enoxaparin Injectable 40milliGRAM(s) SubCutaneous daily  ALBUTerol/ipratropium for Nebulization 3milliLiter(s) Nebulizer every 6 hours  ALBUTerol    90 MICROgram(s) HFA Inhaler 1Puff(s) Inhalation every 4 hours  tiotropium 18 MICROgram(s) Capsule 1Capsule(s) Inhalation daily  vancomycin  IVPB 1000milliGRAM(s) IV Intermittent every 12 hours  aztreonam  IVPB 2000milliGRAM(s) IV Intermittent every 8 hours  docosanol 10% Cream 1Application(s) Topical five times a day  methylPREDNISolone sodium succinate Injectable 40milliGRAM(s) IV Push every 8 hours  montelukast 10milliGRAM(s) Oral daily    MEDICATIONS  (PRN):  acetaminophen   Tablet. 650milliGRAM(s) Oral every 6 hours PRN For Temp greater than 38 C (100.4 F)  acetaminophen   Tablet. 650milliGRAM(s) Oral every 6 hours PRN Mild Pain (1 - 3)  guaiFENesin   Syrup  (Sugar-Free) 200milliGRAM(s) Oral every 6 hours PRN Cough  benzocaine 15 mG/menthol 3.6 mG Lozenge 1Lozenge Oral four times a day PRN Sore Throat  ibuprofen  Tablet 600milliGRAM(s) Oral every 6 hours PRN moderate pain      Vital Signs Last 24 Hrs  Tmax-afebrile  PHYSICAL EXAM:  HEENT : cold sores on upper lip  Constitutional: mild resp distresswell-groomed, well-developed  Respiratory: CTAB/L,no wheezes heard  Cardiovascular: S1 and S2, RRR, no M/G/R  Gastrointestinal: BS+, soft, NT/ND  Extremities: No peripheral edema  Vascular: 2+ peripheral pulses  Skin: No rashes      LABS:             WBC 34--> 27.2  Vanco level 15.2            MICROBIOLOGY:  RECENT CULTURES:  02-12 .Blood Blood XXXX   Growth in aerobic bottle: Gram Positive Cocci in Clusters             RADIOLOGY & ADDITIONAL STUDIES:
Patient is a 76y old  Female who presents with a chief complaint of SOB (12 Feb 2017 12:11)      INTERVAL HPI / OVERNIGHT EVENTS: cough + (persistent )    MEDICATIONS  (STANDING):  aspirin  chewable 81milliGRAM(s) Oral daily  atorvastatin 10milliGRAM(s) Oral at bedtime  enoxaparin Injectable 40milliGRAM(s) SubCutaneous daily  ALBUTerol/ipratropium for Nebulization 3milliLiter(s) Nebulizer every 6 hours  ALBUTerol    90 MICROgram(s) HFA Inhaler 1Puff(s) Inhalation every 4 hours  tiotropium 18 MICROgram(s) Capsule 1Capsule(s) Inhalation daily  vancomycin  IVPB 1000milliGRAM(s) IV Intermittent every 12 hours  aztreonam  IVPB 2000milliGRAM(s) IV Intermittent every 8 hours  docosanol 10% Cream 1Application(s) Topical five times a day  montelukast 10milliGRAM(s) Oral daily  methylPREDNISolone sodium succinate Injectable 20milliGRAM(s) IV Push every 8 hours  benzonatate 100milliGRAM(s) Oral three times a day    MEDICATIONS  (PRN):  acetaminophen   Tablet. 650milliGRAM(s) Oral every 6 hours PRN For Temp greater than 38 C (100.4 F)  acetaminophen   Tablet. 650milliGRAM(s) Oral every 6 hours PRN Mild Pain (1 - 3)  guaiFENesin   Syrup  (Sugar-Free) 200milliGRAM(s) Oral every 6 hours PRN Cough  benzocaine 15 mG/menthol 3.6 mG Lozenge 1Lozenge Oral four times a day PRN Sore Throat  ibuprofen  Tablet 600milliGRAM(s) Oral every 6 hours PRN moderate pain      Vital Signs Last 24 Hrs  T(C): 36.2, Max: 36.9 (02-17 @ 23:30)  T(F): 97.2, Max: 98.4 (02-17 @ 23:30)  HR: 91 (74 - 99)  BP: 126/64 (126/64 - 135/76)  BP(mean): --  RR: 17 (17 - 89)  SpO2: 90% (90% - 98%)    PHYSICAL EXAM:    Constitutional: NAD, well-groomed, well-developed  Respiratory: CTAB/L  Cardiovascular: S1 and S2, RRR, no M/G/R  Gastrointestinal: BS+, soft, NT/ND  Extremities: No peripheral edema  Vascular: 2+ peripheral pulses  Skin: No rashes      LABS:                        10.8   21.7  )-----------( 417      ( 18 Feb 2017 07:46 )             31.5     18 Feb 2017 07:46    139    |  100    |  22     ----------------------------<  186    4.5     |  37     |  0.70     Ca    8.2        18 Feb 2017 07:46    TPro  6.2    /  Alb  1.6    /  TBili  0.4    /  DBili  x      /  AST  34     /  ALT  74     /  AlkPhos  94     18 Feb 2017 07:46            MICROBIOLOGY:  RECENT CULTURES:  02-17 .Blood Blood-Peripheral XXXX XXXX   No growth to date.    02-12 .Blood Blood Staphylococcus aureus   Growth in aerobic bottle: Gram Positive Cocci in Clusters   Growth in aerobic bottle: Staphylococcus aureus          RADIOLOGY & ADDITIONAL STUDIES:
Patient is a 76y old  Female who presents with a chief complaint of SOB (12 Feb 2017 12:11)      INTERVAL HPI / OVERNIGHT EVENTS: cough + chest pain -improving ,shortness of breath -somewhat better    MEDICATIONS  (STANDING):  aspirin  chewable 81milliGRAM(s) Oral daily  atorvastatin 10milliGRAM(s) Oral at bedtime  enoxaparin Injectable 40milliGRAM(s) SubCutaneous daily  ALBUTerol/ipratropium for Nebulization 3milliLiter(s) Nebulizer every 6 hours  ALBUTerol    90 MICROgram(s) HFA Inhaler 1Puff(s) Inhalation every 4 hours  tiotropium 18 MICROgram(s) Capsule 1Capsule(s) Inhalation daily  vancomycin  IVPB 1000milliGRAM(s) IV Intermittent every 12 hours  aztreonam  IVPB 2000milliGRAM(s) IV Intermittent every 8 hours  docosanol 10% Cream 1Application(s) Topical five times a day  montelukast 10milliGRAM(s) Oral daily  methylPREDNISolone sodium succinate Injectable 20milliGRAM(s) IV Push every 8 hours    MEDICATIONS  (PRN):  acetaminophen   Tablet. 650milliGRAM(s) Oral every 6 hours PRN For Temp greater than 38 C (100.4 F)  acetaminophen   Tablet. 650milliGRAM(s) Oral every 6 hours PRN Mild Pain (1 - 3)  guaiFENesin   Syrup  (Sugar-Free) 200milliGRAM(s) Oral every 6 hours PRN Cough  benzocaine 15 mG/menthol 3.6 mG Lozenge 1Lozenge Oral four times a day PRN Sore Throat  ibuprofen  Tablet 600milliGRAM(s) Oral every 6 hours PRN moderate pain      Vital Signs Last 24 Hrs  T(C): 36.1, Max: 37.1 (02-15 @ 23:16)  T(F): 97, Max: 98.8 (02-15 @ 23:16)  HR: 93 (78 - 96)  BP: 136/63 (109/66 - 136/63)  BP(mean): --  RR: 18 (18 - 19)  SpO2: 94% (90% - 97%)    PHYSICAL EXAM:    Constitutional: NAD, well-groomed, well-developed  Respiratory: CTAB/L  Cardiovascular: S1 and S2, RRR, no M/G/R  Gastrointestinal: BS+, soft, NT/ND  Extremities: No peripheral edema  Vascular: 2+ peripheral pulses  Skin: No rashes      LABS:                        10.4   25.4  )-----------( 342      ( 16 Feb 2017 07:34 )             29.5     16 Feb 2017 07:34    141    |  100    |  26     ----------------------------<  250    3.7     |  36     |  0.74     Ca    8.3        16 Feb 2017 07:34    TPro  6.0    /  Alb  1.4    /  TBili  0.3    /  DBili  x      /  AST  24     /  ALT  63     /  AlkPhos  93     16 Feb 2017 07:34            MICROBIOLOGY:  RECENT CULTURES:  02-12 .Blood Blood XXXX   Growth in aerobic bottle: Gram Positive Cocci in Clusters   Growth in aerobic bottle: Staphylococcus aureus          RADIOLOGY & ADDITIONAL STUDIES:
Patient is a 76y old  Female who presents with a chief complaint of SOB (12 Feb 2017 12:11)      INTERVAL HPI/OVERNIGHT EVENTS:    MEDICATIONS  (STANDING):  aspirin  chewable 81milliGRAM(s) Oral daily  atorvastatin 10milliGRAM(s) Oral at bedtime  benzonatate 100milliGRAM(s) Oral three times a day  enoxaparin Injectable 40milliGRAM(s) SubCutaneous daily  ALBUTerol/ipratropium for Nebulization 3milliLiter(s) Nebulizer every 6 hours  ALBUTerol    90 MICROgram(s) HFA Inhaler 1Puff(s) Inhalation every 4 hours  tiotropium 18 MICROgram(s) Capsule 1Capsule(s) Inhalation daily  vancomycin  IVPB 1000milliGRAM(s) IV Intermittent every 12 hours  aztreonam  IVPB 2000milliGRAM(s) IV Intermittent every 8 hours  docosanol 10% Cream 1Application(s) Topical five times a day    MEDICATIONS  (PRN):  acetaminophen   Tablet. 650milliGRAM(s) Oral every 6 hours PRN For Temp greater than 38 C (100.4 F)  acetaminophen   Tablet. 650milliGRAM(s) Oral every 6 hours PRN Mild Pain (1 - 3)  guaiFENesin   Syrup  (Sugar-Free) 200milliGRAM(s) Oral every 6 hours PRN Cough      Allergies    penicillin (Hives; Rash)    Intolerances        REVIEW OF SYSTEMS:  CONSTITUTIONAL:fever chills   EYES: No eye pain, visual disturbances, or discharge  ENMT:  No difficulty hearing, tinnitus, vertigo; No sinus or throat pain  NECK: No pain or stiffness  BREASTS: No pain, masses, or nipple discharge  RESPIRATORY: positive shortness of breath   CARDIOVASCULAR: No chest pain, palpitations, dizziness, or leg swelling  GASTROINTESTINAL: No abdominal or epigastric pain. No nausea, vomiting, or hematemesis; No diarrhea or constipation. No melena or hematochezia.  GENITOURINARY: No dysuria, frequency, hematuria, or incontinence  NEUROLOGICAL: No headaches, memory loss, loss of strength, numbness, or tremors  SKIN: No itching, burning, rashes, or lesions   LYMPH NODES: No enlarged glands  ENDOCRINE: No heat or cold intolerance; No hair loss  MUSCULOSKELETAL: No joint pain or swelling; No muscle, back, or extremity pain  PSYCHIATRIC: No depression, anxiety, mood swings, or difficulty sleeping  HEME/LYMPH: No easy bruising, or bleeding gums  ALLERGY AND IMMUNOLOGIC: No hives or eczema    Vital Signs Last 24 Hrs  T(C): 37.1, Max: 37.2 (02-14 @ 06:11)  T(F): 98.8, Max: 99 (02-14 @ 06:11)  HR: 97 (92 - 104)  BP: 115/66 (115/66 - 139/77)  BP(mean): --  RR: 18 (16 - 18)  SpO2: 92% (92% - 100%)    PHYSICAL EXAM:  GENERAL: NAD, well-groomed, well-developed  HEAD:  Atraumatic, Normocephalic  EYES: EOMI, PERRLA, conjunctiva and sclera clear  ENMT: No tonsillar erythema, exudates, or enlargement; Moist mucous membranes, Good dentition, No lesions  NECK: Supple, No JVD, Normal thyroid  NERVOUS SYSTEM:  Alert & Oriented X3, Good concentration; Motor Strength 5/5 B/L upper and lower extremities; DTRs 2+ intact and symmetric  CHEST/LUNG:   positive wheeze and rhonchi decreased breath sounds right   HEART: Regular rate and rhythm; No murmurs, rubs, or gallops  ABDOMEN: Soft, Nontender, Nondistended; Bowel sounds present  EXTREMITIES:  2+ Peripheral Pulses, No clubbing, cyanosis, or edema  LYMPH: No lymphadenopathy noted  SKIN: No rashes or lesions    LABS:                        11.4   34.2  )-----------( 391      ( 14 Feb 2017 07:12 )             33.6     14 Feb 2017 07:12    141    |  102    |  28     ----------------------------<  101    3.6     |  31     |  0.79     Ca    8.7        14 Feb 2017 07:12  Mg     2.4       13 Feb 2017 10:29    TPro  6.5    /  Alb  1.6    /  TBili  0.3    /  DBili  x      /  AST  49     /  ALT  89     /  AlkPhos  130    14 Feb 2017 07:12        CAPILLARY BLOOD GLUCOSE      RADIOLOGY & ADDITIONAL TESTS:    Imaging Personally Reviewed:  [ ] YES  [ ] NO    Consultant(s) Notes Reviewed:  [ ] YES  [ ] NO    Care Discussed with Consultants/Other Providers [ ] YES  [ ] NO
Patient is a 76y old  Female who presents with a chief complaint of SOB (12 Feb 2017 12:11)      INTERVAL HPI/OVERNIGHT EVENTS: Blood cultures positive for Gram positive in clusters     MEDICATIONS  (STANDING):  aspirin  chewable 81milliGRAM(s) Oral daily  atorvastatin 10milliGRAM(s) Oral at bedtime  benzonatate 100milliGRAM(s) Oral three times a day  enoxaparin Injectable 40milliGRAM(s) SubCutaneous daily  ALBUTerol/ipratropium for Nebulization 3milliLiter(s) Nebulizer every 6 hours  ALBUTerol    90 MICROgram(s) HFA Inhaler 1Puff(s) Inhalation every 4 hours  tiotropium 18 MICROgram(s) Capsule 1Capsule(s) Inhalation daily  vancomycin  IVPB 1000milliGRAM(s) IV Intermittent every 12 hours  aztreonam  IVPB 2000milliGRAM(s) IV Intermittent every 8 hours  docosanol 10% Cream 1Application(s) Topical five times a day  methylPREDNISolone sodium succinate Injectable 40milliGRAM(s) IV Push every 8 hours  montelukast 10milliGRAM(s) Oral daily    MEDICATIONS  (PRN):  acetaminophen   Tablet. 650milliGRAM(s) Oral every 6 hours PRN For Temp greater than 38 C (100.4 F)  acetaminophen   Tablet. 650milliGRAM(s) Oral every 6 hours PRN Mild Pain (1 - 3)  guaiFENesin   Syrup  (Sugar-Free) 200milliGRAM(s) Oral every 6 hours PRN Cough  benzocaine 15 mG/menthol 3.6 mG Lozenge 1Lozenge Oral four times a day PRN Sore Throat  ibuprofen  Tablet 600milliGRAM(s) Oral every 6 hours PRN pain      Allergies    penicillin (Hives; Rash)    Intolerances        REVIEW OF SYSTEMS:  CONSTITUTIONAL: fever,  fatigue  EYES: No eye pain, visual disturbances, or discharge  ENMT:  No difficulty hearing, tinnitus, vertigo; No sinus or throat pain  NECK: No pain or stiffness  BREASTS: No pain, masses, or nipple discharge  RESPIRATORY:  chest pain associated with cough  CARDIOVASCULAR: chest pain, no  palpitations, dizziness, or leg swelling  GASTROINTESTINAL: No abdominal or epigastric pain. No nausea, vomiting, or hematemesis; No diarrhea or constipation. No melena or hematochezia.  GENITOURINARY: No dysuria, frequency, hematuria, or incontinence  NEUROLOGICAL: No headaches, memory loss, loss of strength, numbness, or tremors  SKIN: No itching, burning, rashes, or lesions   LYMPH NODES: No enlarged glands  ENDOCRINE: No heat or cold intolerance; No hair loss  MUSCULOSKELETAL: No joint pain or swelling; No muscle, back, or extremity pain  PSYCHIATRIC: No depression, anxiety, mood swings, or difficulty sleeping  HEME/LYMPH: No easy bruising, or bleeding gums  ALLERGY AND IMMUNOLOGIC: No hives or eczema    Vital Signs Last 24 Hrs  T(C): 36.4, Max: 37.8 (02-14 @ 17:08)  T(F): 97.6, Max: 100 (02-14 @ 17:08)  HR: 87 (84 - 105)  BP: 108/73 (108/73 - 135/67)  BP(mean): --  RR: 19 (18 - 20)  SpO2: 93% (93% - 97%)    PHYSICAL EXAM:  GENERAL: NAD, well-groomed, well-developed  HEAD:  Atraumatic, Normocephalic  EYES: EOMI, PERRLA, conjunctiva and sclera clear  ENMT:  herpatic lesions   NECK: Supple, No JVD, Normal thyroid  NERVOUS SYSTEM:  Alert & Oriented X3, Good concentration; Motor Strength 5/5 B/L upper and lower extremities; DTRs 2+ intact and symmetric  CHEST/LUNG: wheezes and rhonchi   HEART: Regular rate and rhythm; No murmurs, rubs, or gallops  ABDOMEN: Soft, Nontender, Nondistended; Bowel sounds present  EXTREMITIES:  2+ Peripheral Pulses, No clubbing, cyanosis, or edema  LYMPH: No lymphadenopathy noted  SKIN: No rashes or lesions    LABS:                        11.2   27.2  )-----------( 366      ( 15 Feb 2017 07:16 )             32.1     15 Feb 2017 07:16    139    |  100    |  24     ----------------------------<  210    3.6     |  34     |  0.76     Ca    8.2        15 Feb 2017 07:16    TPro  6.1    /  Alb  1.4    /  TBili  0.3    /  DBili  x      /  AST  29     /  ALT  66     /  AlkPhos  106    15 Feb 2017 07:16        CAPILLARY BLOOD GLUCOSE      RADIOLOGY & ADDITIONAL TESTS:    Imaging Personally Reviewed:  [X ] YES  [ ] NO    Consultant(s) Notes Reviewed:  X ] YES  [ ] NO    Care Discussed with Consultants/Other Providers [x ] YES  [ ] NO
Patient is a 76y old  Female who presents with a chief complaint of SOB (12 Feb 2017 12:11)      INTERVAL HPI/OVERNIGHT EVENTS: no acute events continues to complain of cough with laryngitis     MEDICATIONS  (STANDING):  aspirin  chewable 81milliGRAM(s) Oral daily  atorvastatin 10milliGRAM(s) Oral at bedtime  enoxaparin Injectable 40milliGRAM(s) SubCutaneous daily  ALBUTerol/ipratropium for Nebulization 3milliLiter(s) Nebulizer every 6 hours  ALBUTerol    90 MICROgram(s) HFA Inhaler 1Puff(s) Inhalation every 4 hours  tiotropium 18 MICROgram(s) Capsule 1Capsule(s) Inhalation daily  vancomycin  IVPB 1000milliGRAM(s) IV Intermittent every 12 hours  aztreonam  IVPB 2000milliGRAM(s) IV Intermittent every 8 hours  docosanol 10% Cream 1Application(s) Topical five times a day  montelukast 10milliGRAM(s) Oral daily  methylPREDNISolone sodium succinate Injectable 20milliGRAM(s) IV Push every 8 hours  benzonatate 100milliGRAM(s) Oral three times a day    MEDICATIONS  (PRN):  acetaminophen   Tablet. 650milliGRAM(s) Oral every 6 hours PRN For Temp greater than 38 C (100.4 F)  acetaminophen   Tablet. 650milliGRAM(s) Oral every 6 hours PRN Mild Pain (1 - 3)  guaiFENesin   Syrup  (Sugar-Free) 200milliGRAM(s) Oral every 6 hours PRN Cough  benzocaine 15 mG/menthol 3.6 mG Lozenge 1Lozenge Oral four times a day PRN Sore Throat  ibuprofen  Tablet 600milliGRAM(s) Oral every 6 hours PRN moderate pain      Allergies    penicillin (Hives; Rash)    Intolerances        REVIEW OF SYSTEMS:  CONSTITUTIONAL: No fever, weight loss, or fatigue  EYES: No eye pain, visual disturbances, or discharge  ENMT:  No difficulty hearing, tinnitus, vertigo; No sinus or throat pain  NECK: No pain or stiffness  BREASTS: No pain, masses, or nipple discharge  RESPIRATORY:  cough sfhortness ogf breath   CARDIOVASCULAR: No chest pain, palpitations, dizziness, or leg swelling  GASTROINTESTINAL: No abdominal or epigastric pain. No nausea, vomiting, or hematemesis; No diarrhea or constipation. No melena or hematochezia.  GENITOURINARY: No dysuria, frequency, hematuria, or incontinence  NEUROLOGICAL: No headaches, memory loss, loss of strength, numbness, or tremors  SKIN: No itching, burning, rashes, or lesions   LYMPH NODES: No enlarged glands  ENDOCRINE: No heat or cold intolerance; No hair loss  MUSCULOSKELETAL: No joint pain or swelling; No muscle, back, or extremity pain  PSYCHIATRIC: No depression, anxiety, mood swings, or difficulty sleeping  HEME/LYMPH: No easy bruising, or bleeding gums  ALLERGY AND IMMUNOLOGIC: No hives or eczema    Vital Signs Last 24 Hrs  T(C): 36.1, Max: 37.1 (02-16 @ 23:15)  T(F): 97, Max: 98.8 (02-16 @ 23:15)  HR: 90 (76 - 95)  BP: 124/53 (106/67 - 137/64)  BP(mean): --  RR: 17 (17 - 20)  SpO2: 96% (91% - 97%)    PHYSICAL EXAM:  GENERAL: NAD, well-groomed, well-developed  HEAD:  Atraumatic, Normocephalic  EYES: EOMI, PERRLA, conjunctiva and sclera clear  ENMT: No tonsillar erythema, exudates, or enlargement; Moist mucous membranes, Good dentition, No lesions  NECK: Supple, No JVD, Normal thyroid  NERVOUS SYSTEM:  Alert & Oriented X3, Good concentration; Motor Strength 5/5 B/L upper and lower extremities; DTRs 2+ intact and symmetric  CHEST/LUNG: Clear to percussion bilaterally; No rales, rhonchi, wheezing, or rubs  HEART: Regular rate and rhythm; No murmurs, rubs, or gallops  ABDOMEN: Soft, Nontender, Nondistended; Bowel sounds present  EXTREMITIES:  2+ Peripheral Pulses, No clubbing, cyanosis, or edema  LYMPH: No lymphadenopathy noted  SKIN: No rashes or lesions    LABS:                        9.9    24.7  )-----------( 422      ( 17 Feb 2017 07:57 )             29.2     17 Feb 2017 07:57    140    |  101    |  27     ----------------------------<  199    4.0     |  35     |  0.79     Ca    8.3        17 Feb 2017 07:57    TPro  5.7    /  Alb  1.5    /  TBili  0.3    /  DBili  x      /  AST  28     /  ALT  70     /  AlkPhos  91     17 Feb 2017 07:57        CAPILLARY BLOOD GLUCOSE      RADIOLOGY & ADDITIONAL TESTS:    Imaging Personally Reviewed:  [ ] YES  [ ] NO    Consultant(s) Notes Reviewed:  [ ] YES  [ ] NO    Care Discussed with Consultants/Other Providers [ ] YES  [ ] NO
Patient is a 76y old  Female who presents with a chief complaint of SOB (12 Feb 2017 12:11)      INTERVAL HPI/OVERNIGHT EVENTS: no acute events overnight afebrile feeling better     MEDICATIONS  (STANDING):  aspirin  chewable 81milliGRAM(s) Oral daily  atorvastatin 10milliGRAM(s) Oral at bedtime  enoxaparin Injectable 40milliGRAM(s) SubCutaneous daily  ALBUTerol/ipratropium for Nebulization 3milliLiter(s) Nebulizer every 6 hours  ALBUTerol    90 MICROgram(s) HFA Inhaler 1Puff(s) Inhalation every 4 hours  tiotropium 18 MICROgram(s) Capsule 1Capsule(s) Inhalation daily  vancomycin  IVPB 1000milliGRAM(s) IV Intermittent every 12 hours  aztreonam  IVPB 2000milliGRAM(s) IV Intermittent every 8 hours  docosanol 10% Cream 1Application(s) Topical five times a day  montelukast 10milliGRAM(s) Oral daily  methylPREDNISolone sodium succinate Injectable 20milliGRAM(s) IV Push every 8 hours  benzonatate 100milliGRAM(s) Oral three times a day    MEDICATIONS  (PRN):  acetaminophen   Tablet. 650milliGRAM(s) Oral every 6 hours PRN For Temp greater than 38 C (100.4 F)  acetaminophen   Tablet. 650milliGRAM(s) Oral every 6 hours PRN Mild Pain (1 - 3)  guaiFENesin   Syrup  (Sugar-Free) 200milliGRAM(s) Oral every 6 hours PRN Cough  benzocaine 15 mG/menthol 3.6 mG Lozenge 1Lozenge Oral four times a day PRN Sore Throat  ibuprofen  Tablet 600milliGRAM(s) Oral every 6 hours PRN moderate pain      Allergies    penicillin (Hives; Rash)    Intolerances        REVIEW OF SYSTEMS:  CONSTITUTIONAL: fatigue  EYES: No eye pain, visual disturbances, or discharge  ENMT:  sore throat horseness  NECK: No pain or stiffness  BREASTS: No pain, masses, or nipple discharge  RESPIRATORY: cough wheezing shortness of breath   CARDIOVASCULAR: No chest pain, palpitations, dizziness, or leg swelling  GASTROINTESTINAL: No abdominal or epigastric pain. No nausea, vomiting, or hematemesis; No diarrhea or constipation. No melena or hematochezia.  GENITOURINARY: No dysuria, frequency, hematuria, or incontinence  NEUROLOGICAL: No headaches, memory loss, loss of strength, numbness, or tremors  SKIN: No itching, burning, rashes, or lesions   LYMPH NODES: No enlarged glands  ENDOCRINE: No heat or cold intolerance; No hair loss  MUSCULOSKELETAL: No joint pain or swelling; No muscle, back, or extremity pain  PSYCHIATRIC: No depression, anxiety, mood swings, or difficulty sleeping  HEME/LYMPH: No easy bruising, or bleeding gums  ALLERGY AND IMMUNOLOGIC: No hives or eczema    Vital Signs Last 24 Hrs  T(C): 36.6, Max: 36.9 (02-17 @ 23:30)  T(F): 97.8, Max: 98.4 (02-17 @ 23:30)  HR: 99 (74 - 99)  BP: 128/67 (124/53 - 135/76)  BP(mean): --  RR: 17 (17 - 17)  SpO2: 90% (90% - 98%)    PHYSICAL EXAM:  GENERAL: NAD, well-groomed, well-developed  HEAD:  Atraumatic, Normocephalic  EYES: EOMI, PERRLA, conjunctiva and sclera clear  ENMT: No tonsillar erythema, exudates, or enlargement; Moist mucous membranes, Good dentition, No lesions  NECK: Supple, No JVD, Normal thyroid  NERVOUS SYSTEM:  Alert & Oriented X3, Good concentration; Motor Strength 5/5 B/L upper and lower extremities; DTRs 2+ intact and symmetric  CHEST/LUNG: wheezing reduced rhonchi   HEART: Regular rate and rhythm; No murmurs, rubs, or gallops  ABDOMEN: Soft, Nontender, Nondistended; Bowel sounds present  EXTREMITIES:  2+ Peripheral Pulses, No clubbing, cyanosis, or edema  LYMPH: No lymphadenopathy noted  SKIN: No rashes or lesions    LABS:                        10.8   21.7  )-----------( 417      ( 18 Feb 2017 07:46 )             31.5     18 Feb 2017 07:46    139    |  100    |  22     ----------------------------<  186    4.5     |  37     |  0.70     Ca    8.2        18 Feb 2017 07:46    TPro  6.2    /  Alb  1.6    /  TBili  0.4    /  DBili  x      /  AST  34     /  ALT  74     /  AlkPhos  94     18 Feb 2017 07:46        CAPILLARY BLOOD GLUCOSE      RADIOLOGY & ADDITIONAL TESTS:    Imaging Personally Reviewed:  [ X] YES  [ ] NO    Consultant(s) Notes Reviewed:  [ X] YES  [ ] NO    Care Discussed with Consultants/Other Providers [X ] YES  [ ] NO
Patient is a 76y old  Female who presents with a chief complaint of SOB (12 Feb 2017 12:11)      INTERVAL HPI/OVERNIGHT EVENTS: no acute events overnight complains of swollen ankles     MEDICATIONS  (STANDING):  aspirin  chewable 81milliGRAM(s) Oral daily  atorvastatin 10milliGRAM(s) Oral at bedtime  enoxaparin Injectable 40milliGRAM(s) SubCutaneous daily  ALBUTerol/ipratropium for Nebulization 3milliLiter(s) Nebulizer every 6 hours  ALBUTerol    90 MICROgram(s) HFA Inhaler 1Puff(s) Inhalation every 4 hours  tiotropium 18 MICROgram(s) Capsule 1Capsule(s) Inhalation daily  vancomycin  IVPB 1000milliGRAM(s) IV Intermittent every 12 hours  aztreonam  IVPB 2000milliGRAM(s) IV Intermittent every 8 hours  docosanol 10% Cream 1Application(s) Topical five times a day  montelukast 10milliGRAM(s) Oral daily  methylPREDNISolone sodium succinate Injectable 20milliGRAM(s) IV Push every 8 hours    MEDICATIONS  (PRN):  acetaminophen   Tablet. 650milliGRAM(s) Oral every 6 hours PRN For Temp greater than 38 C (100.4 F)  acetaminophen   Tablet. 650milliGRAM(s) Oral every 6 hours PRN Mild Pain (1 - 3)  guaiFENesin   Syrup  (Sugar-Free) 200milliGRAM(s) Oral every 6 hours PRN Cough  benzocaine 15 mG/menthol 3.6 mG Lozenge 1Lozenge Oral four times a day PRN Sore Throat  ibuprofen  Tablet 600milliGRAM(s) Oral every 6 hours PRN moderate pain      Allergies    penicillin (Hives; Rash)    Intolerances        REVIEW OF SYSTEMS:  CONSTITUTIONAL: No fever, weight loss, or fatigue  EYES: No eye pain, visual disturbances, or discharge  ENMT:  No difficulty hearing, tinnitus, vertigo; No sinus or throat pain  NECK: No pain or stiffness  BREASTS: No pain, masses, or nipple discharge  RESPIRATORY: cdough shortness of breath   CARDIOVASCULAR: No chest pain, palpitations, dizziness, or leg swelling  GASTROINTESTINAL: No abdominal or epigastric pain. No nausea, vomiting, or hematemesis; No diarrhea or constipation. No melena or hematochezia.  GENITOURINARY: No dysuria, frequency, hematuria, or incontinence  NEUROLOGICAL: No headaches, memory loss, loss of strength, numbness, or tremors  SKIN: No itching, burning, rashes, or lesions   LYMPH NODES: No enlarged glands  ENDOCRINE: No heat or cold intolerance; No hair loss  MUSCULOSKELETAL:  Right ankle swelling   PSYCHIATRIC: No depression, anxiety, mood swings, or difficulty sleeping  HEME/LYMPH: No easy bruising, or bleeding gums  ALLERGY AND IMMUNOLOGIC: No hives or eczema    Vital Signs Last 24 Hrs  T(C): 36.9, Max: 37.1 (02-15 @ 16:39)  T(F): 98.4, Max: 98.8 (02-15 @ 16:39)  HR: 95 (78 - 96)  BP: 109/66 (109/66 - 140/65)  BP(mean): --  RR: 19 (18 - 20)  SpO2: 90% (90% - 97%)    PHYSICAL EXAM:  GENERAL: NAD, well-groomed, well-developed  HEAD:  Atraumatic, Normocephalic  EYES: EOMI, PERRLA, conjunctiva and sclera clear  ENMT: No tonsillar erythema, exudates, or enlargement; Moist mucous membranes, Good dentition, No lesions  NECK: Supple, No JVD, Normal thyroid  NERVOUS SYSTEM:  Alert & Oriented X3, Good concentration; Motor Strength 5/5 B/L upper and lower extremities; DTRs 2+ intact and symmetric  CHEST/LUNG: Clear to percussion bilaterally; No rales, rhonchi, wheezing, or rubs  HEART: Regular rate and rhythm; No murmurs, rubs, or gallops  ABDOMEN: Soft, Nontender, Nondistended; Bowel sounds present  EXTREMITIES:  2+ Peripheral Pulses, No clubbing, cyanosis, or edema  LYMPH: No lymphadenopathy noted  SKIN: No rashes or lesions    LABS:                        10.4   25.4  )-----------( 342      ( 16 Feb 2017 07:34 )             29.5     16 Feb 2017 07:34    141    |  100    |  26     ----------------------------<  250    3.7     |  36     |  0.74     Ca    8.3        16 Feb 2017 07:34    TPro  6.0    /  Alb  1.4    /  TBili  0.3    /  DBili  x      /  AST  24     /  ALT  63     /  AlkPhos  93     16 Feb 2017 07:34        CAPILLARY BLOOD GLUCOSE      RADIOLOGY & ADDITIONAL TESTS:    Imaging Personally Reviewed:  [x YES  [ ] NO    Consultant(s) Notes Reviewed:  [ x] YES  [ ] NO    Care Discussed with Consultants/Other Providers [ x] YES  [ ] NO
Patient is a 76y old  Female who presents with a chief complaint of SOB (12 Feb 2017 12:11)      INTERVAL HPI/OVERNIGHT EVENTS: patient bounce back after being discharged with pneumonia     MEDICATIONS  (STANDING):  aspirin  chewable 81milliGRAM(s) Oral daily  atorvastatin 10milliGRAM(s) Oral at bedtime  benzonatate 100milliGRAM(s) Oral three times a day  enoxaparin Injectable 40milliGRAM(s) SubCutaneous daily  ALBUTerol/ipratropium for Nebulization 3milliLiter(s) Nebulizer every 6 hours  ALBUTerol    90 MICROgram(s) HFA Inhaler 1Puff(s) Inhalation every 4 hours  tiotropium 18 MICROgram(s) Capsule 1Capsule(s) Inhalation daily  vancomycin  IVPB 1000milliGRAM(s) IV Intermittent every 12 hours  aztreonam  IVPB 2000milliGRAM(s) IV Intermittent every 8 hours  docosanol 10% Cream 1Application(s) Topical five times a day    MEDICATIONS  (PRN):  acetaminophen   Tablet. 650milliGRAM(s) Oral every 6 hours PRN For Temp greater than 38 C (100.4 F)  acetaminophen   Tablet. 650milliGRAM(s) Oral every 6 hours PRN Mild Pain (1 - 3)  guaiFENesin   Syrup  (Sugar-Free) 200milliGRAM(s) Oral every 6 hours PRN Cough      Allergies    penicillin (Hives; Rash)    Intolerances        REVIEW OF SYSTEMS:  CONSTITUTIONAL: No fever, weight loss, or fatigue  EYES: No eye pain, visual disturbances, or discharge  ENMT:  No difficulty hearing, tinnitus, vertigo; No sinus or throat pain  NECK: No pain or stiffness  BREASTS: No pain, masses, or nipple discharge  RESPIRATORY:cough  wheeze   CARDIOVASCULAR: No chest pain, palpitations, dizziness, or leg swelling  GASTROINTESTINAL: No abdominal or epigastric pain. No nausea, vomiting, or hematemesis; No diarrhea or constipation. No melena or hematochezia.  GENITOURINARY: No dysuria, frequency, hematuria, or incontinence  NEUROLOGICAL: No headaches, memory loss, loss of strength, numbness, or tremors  SKIN: No itching, burning, rashes, or lesions   LYMPH NODES: No enlarged glands  ENDOCRINE: No heat or cold intolerance; No hair loss  MUSCULOSKELETAL: No joint pain or swelling; No muscle, back, or extremity pain  PSYCHIATRIC: No depression, anxiety, mood swings, or difficulty sleeping  HEME/LYMPH: No easy bruising, or bleeding gums  ALLERGY AND IMMUNOLOGIC: No hives or eczema    Vital Signs Last 24 Hrs  T(C): 36.1, Max: 37.1 (02-12 @ 16:58)  T(F): 97, Max: 98.8 (02-12 @ 16:58)  HR: 90 (76 - 95)  BP: 122/66 (121/65 - 128/75)  BP(mean): --  RR: 16 (16 - 17)  SpO2: 93% (93% - 96%)    PHYSICAL EXAM:  GENERAL: NAD, well-groomed, well-developed  HEAD:  Atraumatic, Normocephalic  EYES: EOMI, PERRLA, conjunctiva and sclera clear  ENMT: herpatic lesions mouth   NECK: Supple, No JVD, Normal thyroid  NERVOUS SYSTEM:  Alert & Oriented X3, Good concentration; Motor Strength 5/5 B/L upper and lower extremities; DTRs 2+ intact and symmetric  CHEST/LUNG:   positive wheeze positive crackles and rhonchi   HEART: Regular rate and rhythm; No murmurs, rubs, or gallops  ABDOMEN: Soft, Nontender, Nondistended; Bowel sounds present  EXTREMITIES:  2+ Peripheral Pulses, No clubbing, cyanosis, or edema  LYMPH: No lymphadenopathy noted  SKIN: No rashes or lesions    LABS:                        11.6   41.6  )-----------( 407      ( 13 Feb 2017 10:29 )             33.2     13 Feb 2017 10:29    141    |  102    |  29     ----------------------------<  254    3.8     |  35     |  0.85     Ca    8.8        13 Feb 2017 10:29  Mg     2.4       13 Feb 2017 10:29    TPro  6.4    /  Alb  1.9    /  TBili  0.4    /  DBili  x      /  AST  45     /  ALT  86     /  AlkPhos  112    12 Feb 2017 10:55    PT/INR - ( 12 Feb 2017 10:55 )   PT: 15.4 sec;   INR: 1.37 ratio         PTT - ( 12 Feb 2017 10:55 )  PTT:27.9 sec    CAPILLARY BLOOD GLUCOSE      RADIOLOGY & ADDITIONAL TESTS:    Imaging Personally Reviewed:  [ X] YES  [ ] NO    Consultant(s) Notes Reviewed:  [ X] YES  [ ] NO    Care Discussed with Consultants/Other Providers [ X] YES  [ ] NO

## 2017-02-19 NOTE — DISCHARGE NOTE ADULT - CARE PROVIDER_API CALL
Ben Santillan), Internal Medicine  [address] [place] [address]  Phone: [phone]  Fax: [phone]

## 2017-02-19 NOTE — DISCHARGE NOTE ADULT - PATIENT PORTAL LINK FT
“You can access the [employer] Patient Portal, offered by [facility], by registering with the following website: [email or web address]”

## 2017-02-22 DIAGNOSIS — J15.211 PNEUMONIA DUE TO METHICILLIN SUSCEPTIBLE STAPHYLOCOCCUS AUREUS: ICD-10-CM

## 2017-02-22 DIAGNOSIS — J43.1 PANLOBULAR EMPHYSEMA: ICD-10-CM

## 2017-02-22 DIAGNOSIS — J15.6 PNEUMONIA DUE TO OTHER GRAM-NEGATIVE BACTERIA: ICD-10-CM

## 2017-02-22 DIAGNOSIS — A41.01 SEPSIS DUE TO METHICILLIN SUSCEPTIBLE STAPHYLOCOCCUS AUREUS: ICD-10-CM

## 2017-02-22 DIAGNOSIS — B00.1 HERPESVIRAL VESICULAR DERMATITIS: ICD-10-CM

## 2017-02-22 DIAGNOSIS — J44.1 CHRONIC OBSTRUCTIVE PULMONARY DISEASE WITH (ACUTE) EXACERBATION: ICD-10-CM

## 2017-02-22 DIAGNOSIS — A40.3 SEPSIS DUE TO STREPTOCOCCUS PNEUMONIAE: ICD-10-CM

## 2017-02-22 DIAGNOSIS — R60.0 LOCALIZED EDEMA: ICD-10-CM

## 2017-02-22 DIAGNOSIS — E78.00 PURE HYPERCHOLESTEROLEMIA, UNSPECIFIED: ICD-10-CM

## 2017-02-22 DIAGNOSIS — Z87.891 PERSONAL HISTORY OF NICOTINE DEPENDENCE: ICD-10-CM

## 2017-02-22 DIAGNOSIS — J96.01 ACUTE RESPIRATORY FAILURE WITH HYPOXIA: ICD-10-CM

## 2017-02-22 DIAGNOSIS — E78.5 HYPERLIPIDEMIA, UNSPECIFIED: ICD-10-CM

## 2017-02-22 DIAGNOSIS — A41.50 GRAM-NEGATIVE SEPSIS, UNSPECIFIED: ICD-10-CM

## 2017-02-22 DIAGNOSIS — Z88.0 ALLERGY STATUS TO PENICILLIN: ICD-10-CM

## 2017-02-22 DIAGNOSIS — Z79.82 LONG TERM (CURRENT) USE OF ASPIRIN: ICD-10-CM

## 2017-02-22 DIAGNOSIS — J44.0 CHRONIC OBSTRUCTIVE PULMONARY DISEASE WITH ACUTE LOWER RESPIRATORY INFECTION: ICD-10-CM

## 2017-02-22 LAB
CULTURE RESULTS: SIGNIFICANT CHANGE UP
CULTURE RESULTS: SIGNIFICANT CHANGE UP
SPECIMEN SOURCE: SIGNIFICANT CHANGE UP
SPECIMEN SOURCE: SIGNIFICANT CHANGE UP

## 2017-09-13 NOTE — PATIENT PROFILE ADULT. - NS TRANSFER RESPONSE BELONGING
Writer spoke with Perry.  Informed of weaning doses of medication.  Patient verbalized agreement; no further questions.   yes

## 2018-04-10 ENCOUNTER — RESULT REVIEW (OUTPATIENT)
Age: 78
End: 2018-04-10

## 2020-02-10 NOTE — PATIENT PROFILE ADULT. - MENTAL HEALTH CONDITIONS/SYMPTOMS, PROFILE
LABS:                        12.6   17.05 )-----------( 271      ( 10 Feb 2020 11:35 )             35.2     02-10    139  |  105  |  7   ----------------------------<  88  4.2   |  24  |  1.23    Ca    9.2      10 Feb 2020 11:35    TPro  7.4  /  Alb  4.2  /  TBili  1.6<H>  /  DBili  x   /  AST  35  /  ALT  20  /  AlkPhos  69  02-10    PT/INR - ( 10 Feb 2020 11:35 )   PT: 13.6 SEC;   INR: 1.22          PTT - ( 10 Feb 2020 11:35 )  PTT:25.9 SEC            RADIOLOGY & ADDITIONAL TESTS:  < from: CT Angio Chest w/ IV Cont (02.10.20 @ 13:31) >    FINDINGS:  Patent central airways. Bilateral patchy opacities are new from the prior exam. There is no pleural effusion or pneumothorax.    There is bilateral hilar lymphadenopathy. Heart is enlarged in size, particularly the right sided chambers. Hypertrophy of the right ventricular myocardium is noted. Main pulmonary artery measures 3.6 cm. There are bilateral segmental and subsegmental pulmonary emboli.    The thyroid is unremarkable and the extrathoracic chest wall within normal limits. The bones are normal.    IMPRESSION:   Bilateral segmental and subsegmental pulmonary emboli, new from the prior exam.  Patchy bilateral opacities, new from the prior, of uncertain etiology.      < end of copied text > LABS:                        12.6   17.05 )-----------( 271      ( 10 Feb 2020 11:35 )             35.2     02-10    139  |  105  |  7   ----------------------------<  88  4.2   |  24  |  1.23    Ca    9.2      10 Feb 2020 11:35    TPro  7.4  /  Alb  4.2  /  TBili  1.6<H>  /  DBili  x   /  AST  35  /  ALT  20  /  AlkPhos  69  02-10    PT/INR - ( 10 Feb 2020 11:35 )   PT: 13.6 SEC;   INR: 1.22          PTT - ( 10 Feb 2020 11:35 )  PTT:25.9 SEC            RADIOLOGY & ADDITIONAL TESTS:  < from: Xray Chest 2 Views PA/Lat (02.10.20 @ 13:44) >    IMPRESSION:  Redemonstrated slightly blunted right CP angle compatible with a chronic small pleural reaction. Sharp appearing left CP angle.    Hazy ill-defined increased right perihilar markings indeterminate for asymmetric edema or perihilar infection/pneumonia in the proper clinical context. Also correlate with findings on earlier performed chest CT. Clear remaining visualized lungs. No pneumothorax.    Cardiomegaly.    Trachea midline.    Unremarkable osseous structures.    < end of copied text >              < from: CT Angio Chest w/ IV Cont (02.10.20 @ 13:31) >    FINDINGS:  Patent central airways. Bilateral patchy opacities are new from the prior exam. There is no pleural effusion or pneumothorax.    There is bilateral hilar lymphadenopathy. Heart is enlarged in size, particularly the right sided chambers. Hypertrophy of the right ventricular myocardium is noted. Main pulmonary artery measures 3.6 cm. There are bilateral segmental and subsegmental pulmonary emboli.    The thyroid is unremarkable and the extrathoracic chest wall within normal limits. The bones are normal.    IMPRESSION:   Bilateral segmental and subsegmental pulmonary emboli, new from the prior exam.  Patchy bilateral opacities, new from the prior, of uncertain etiology.      < end of copied text > none

## 2020-06-25 NOTE — ED ADULT NURSE NOTE - NS ED NURSE LEVEL OF CONSCIOUSNESS SPEECH
Forwarded this message on to Dr. Acosta & Saida.        Dos: 7/8/2020 - Dr. Acosta - [facility]   Received: Today   Message Contents   Lida REARDON Ortho Surg Sched [place] - Eros Acosta; [facility] Preauth Sched [facility]             Good morning,     I received a voicemail from Ilda at [employer] and she states:     Denied due to being investigational.     Options:     Peer to Peer Review by calling [phone] and leaving a voicemail with 3 different days, times, availibility, timezone, and reference number [id number]     Reconsideration can be done within 10 business days by faxing the reason for the reconsideration and any clinical information (fax # [phone])     Just as an fyi, I did include all of the coflex information that I received from the coflex rep, including the coflex letter. So this was reviewed during the pre-auth process. Please advise.     Thanks,   Lida         Speaking Coherently

## 2021-11-10 NOTE — DISCHARGE NOTE ADULT - REASON FOR REFUSAL
81 y/o F w PMHx of htn, hld, DM type 2 on insulin and CVA w residual left sided weakness, presents to ed for hyperglycemia and weakness, noted today by pt at home, at this time no complaints from patient. denies fever, chills, cp, sob, abd pain, nvd, falls. I don't want it

## 2022-01-03 NOTE — DISCHARGE NOTE ADULT - NSTOBACCOSMKCESSPRO_GEN_A_CS
Patient wants 90 day supply of Fluoxetine   90 day supply resent     Warnings popped up when trying to sign this   
Referred to [facility] for Tobacco Control...

## 2023-10-12 ENCOUNTER — APPOINTMENT (OUTPATIENT)
Dept: ORTHOPEDIC SURGERY | Facility: CLINIC | Age: 83
End: 2023-10-12
Payer: MEDICARE

## 2023-10-12 VITALS — BODY MASS INDEX: 21.66 KG/M2 | WEIGHT: 130 LBS | HEIGHT: 65 IN

## 2023-10-12 DIAGNOSIS — G56.01 CARPAL TUNNEL SYNDROME, RIGHT UPPER LIMB: ICD-10-CM

## 2023-10-12 DIAGNOSIS — I10 ESSENTIAL (PRIMARY) HYPERTENSION: ICD-10-CM

## 2023-10-12 PROCEDURE — 99203 OFFICE O/P NEW LOW 30 MIN: CPT

## 2023-10-12 RX ORDER — VALSARTAN 40 MG/1
TABLET, COATED ORAL
Refills: 0 | Status: ACTIVE | COMMUNITY

## 2024-01-31 NOTE — PROGRESS NOTE ADULT - MINUTES
The catheter was inserted over the wire into the ostium   right coronary artery. Hemodynamics were performed.  An angiography was performed of the right coronary arteries. Multiple views were taken. Catheter exchanged over J wire 30

## 2024-05-16 NOTE — CONSULT NOTE ADULT - PROBLEM/RECOMMENDATION-1
Nail Removal    Date/Time: 5/16/2024 8:15 AM    Performed by: Tereza Diaz DPM  Authorized by: Tereza Diaz DPM    Consent Done?:  Yes (Written)  Time out: Immediately prior to the procedure a time out was called    Prep: patient was prepped and draped in usual sterile fashion    Location:     Location:  Right foot    Location detail:  Right second toe  Anesthesia:     Anesthesia:  Digital block    Local anesthetic:  Lidocaine 1% without epinephrine (0.75% Marcaine plain)    Anesthetic total (ml):  3  Procedure Details:     Preparation:  Skin prepped with alcohol and skin prepped with Betadine    Side:  Medial    Wedge excision of skin of nail fold: No      Nail bed sutured?: No      Nail matrix removed:  Partial    Removal method:  Phenol and alcohol    Dressing applied:  4x4, antibiotic ointment and dressing applied    Patient tolerance:  Patient tolerated the procedure well with no immediate complications     DISPLAY PLAN FREE TEXT